# Patient Record
Sex: FEMALE | Race: BLACK OR AFRICAN AMERICAN | NOT HISPANIC OR LATINO | Employment: OTHER | URBAN - METROPOLITAN AREA
[De-identification: names, ages, dates, MRNs, and addresses within clinical notes are randomized per-mention and may not be internally consistent; named-entity substitution may affect disease eponyms.]

---

## 2017-01-23 DIAGNOSIS — N36.2 URETHRAL CARUNCLE: ICD-10-CM

## 2017-01-23 DIAGNOSIS — N95.2 VAGINAL ATROPHY: ICD-10-CM

## 2017-02-17 ENCOUNTER — OFFICE VISIT (OUTPATIENT)
Dept: UROLOGY | Facility: CLINIC | Age: 82
End: 2017-02-17
Payer: MEDICARE

## 2017-02-17 VITALS
HEIGHT: 62 IN | DIASTOLIC BLOOD PRESSURE: 80 MMHG | SYSTOLIC BLOOD PRESSURE: 150 MMHG | WEIGHT: 147.5 LBS | BODY MASS INDEX: 27.14 KG/M2 | HEART RATE: 88 BPM

## 2017-02-17 DIAGNOSIS — N95.2 VAGINAL ATROPHY: ICD-10-CM

## 2017-02-17 DIAGNOSIS — R31.29 MICROSCOPIC HEMATURIA: Primary | ICD-10-CM

## 2017-02-17 PROCEDURE — 87088 URINE BACTERIA CULTURE: CPT

## 2017-02-17 PROCEDURE — 99213 OFFICE O/P EST LOW 20 MIN: CPT | Mod: S$PBB,,, | Performed by: UROLOGY

## 2017-02-17 PROCEDURE — 87077 CULTURE AEROBIC IDENTIFY: CPT

## 2017-02-17 PROCEDURE — 87086 URINE CULTURE/COLONY COUNT: CPT

## 2017-02-17 PROCEDURE — 81002 URINALYSIS NONAUTO W/O SCOPE: CPT | Mod: PBBFAC | Performed by: UROLOGY

## 2017-02-17 PROCEDURE — 87186 SC STD MICRODIL/AGAR DIL: CPT

## 2017-02-17 PROCEDURE — 51701 INSERT BLADDER CATHETER: CPT | Mod: PBBFAC | Performed by: UROLOGY

## 2017-02-17 PROCEDURE — 99215 OFFICE O/P EST HI 40 MIN: CPT | Mod: PBBFAC | Performed by: UROLOGY

## 2017-02-17 PROCEDURE — 99999 PR PBB SHADOW E&M-EST. PATIENT-LVL V: CPT | Mod: PBBFAC,,, | Performed by: UROLOGY

## 2017-02-17 RX ORDER — LIDOCAINE HYDROCHLORIDE 20 MG/ML
JELLY TOPICAL ONCE
Status: CANCELLED | OUTPATIENT
Start: 2017-02-17 | End: 2017-02-17

## 2017-02-17 RX ORDER — CIPROFLOXACIN 250 MG/1
500 TABLET, FILM COATED ORAL ONCE
Status: CANCELLED | OUTPATIENT
Start: 2017-02-17 | End: 2017-02-17

## 2017-02-17 NOTE — PROGRESS NOTES
CHIEF COMPLAINT:    Mrs. Chino is a 81 y.o. female presenting for a follow up on vaginal atrophy, urethral caruncle    PRESENTING ILLNESS:    Lilia Chino is a 81 y.o. female who returns for follow up.  She confirms that she has been using the Premarin cream but with the applicator.  She states that she has not had a UTI since she was last seen.  She sometimes gets an irritation on the external genitalia for which she applies Mycolog cream but that is rare.  No other complaints.      REVIEW OF SYSTEMS:    Review of Systems   Constitutional: Negative.    HENT: Negative.    Eyes: Negative.    Respiratory: Negative.    Cardiovascular: Negative.    Gastrointestinal: Positive for heartburn.   Genitourinary: Negative.    Musculoskeletal: Positive for back pain.   Skin: Negative.    Neurological: Positive for sensory change (diabetic neuropathy).   Endo/Heme/Allergies: Negative.    Psychiatric/Behavioral: Negative.      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     GERD (gastroesophageal reflux disease)     Glaucoma     HBP (high blood pressure)     HLD (hyperlipidemia)     Hypoglycemia     Osteoarthritis     Pacemaker        Past Surgical History   Procedure Laterality Date    Esophagogastroduodenoscopy      Cardiac pacemaker placement         Family History   Problem Relation Age of Onset    Hypertension Mother     Cancer Father     Cancer Sister     Cancer Brother      Social History    Marital status:      Social History Main Topics    Smoking status: Never Smoker    Smokeless tobacco: Not on file    Alcohol use No    Drug use: Not on file    Sexual activity: Not on file       Allergies:  Pcn [penicillins] and Tylenol [acetaminophen]    Medications:  Outpatient Encounter Prescriptions as of 2/17/2017   Medication Sig Dispense  Refill    aspirin 81 MG Chew Take 81 mg by mouth once daily.      atenolol (TENORMIN) 50 MG tablet TAKE 1 TABLET EVERY DAY *THANK YOU* *GOD BLESS* 30 tablet 11    brimonidine 0.2% (ALPHAGAN) 0.2 % Drop 1 drop 2 (two) times daily.   4    calcium citrate-vitamin D3 315-200 mg (CITRACAL+D) 315-200 mg-unit per tablet Take 1 tablet by mouth once daily.      conjugated estrogens (PREMARIN) vaginal cream Place 0.5 g vaginally 3 (three) times a week. By finger tip application. 30 g 2    dorzolamide-timolol 2-0.5% (COSOPT) 22.3-6.8 mg/mL ophthalmic solution Place 1 drop into both eyes 2 (two) times daily.      ferrous fumarate (DASHAWN-SEQUELS) 55 mg (18 mg iron) TbSR Take 18 mg by mouth 2 (two) times daily.      folic acid (FOLVITE) 1 MG tablet TAKE ONE TABLET DAILY IN THE MORNING. (Patient taking differently: Take 1 mg by mouth once daily. TAKE ONE TABLET DAILY IN THE MORNING.) 90 tablet 3    gabapentin (NEURONTIN) 300 MG capsule TAKE 1 CAPSULE AT BEDTIME *THANK YOU* *GOD BLESS* 30 capsule 11    garlic 1,000 mg Cap Take 500 mg by mouth once daily.       lancets (TRUEPLUS LANCETS) 30 gauge Misc 1 lancet by Misc.(Non-Drug; Combo Route) route once daily. 100 each 3    latanoprost 0.005 % ophthalmic solution Place 1 drop into both eyes every evening.   4    meclizine (ANTIVERT) 12.5 mg tablet TAKE 1 OR 2 TABLETS EVERY 8 HOURS AS NEEDED FOR DIZZINESS *THANK YOU* *GOD BLESS* 30 tablet 5    metformin (GLUCOPHAGE) 500 MG tablet TAKE (1) TABLET THREE TIMES DAILY FOR DIABETES *THANK YOU* *GOD BLESS* 90 tablet 11    multivitamin (THERAGRAN) per tablet Take 1 tablet by mouth once daily.      nystatin-triamcinolone (MYCOLOG II) cream Apply topically 4 (four) times daily. 60 g 2    omeprazole (PRILOSEC) 20 MG capsule TAKE 1 CAPSULE BY MOUTH TWICE DAILY FOR stomach 90 capsule 3    simvastatin (ZOCOR) 10 MG tablet TAKE ONE TABLET AT BEDTIME 30 tablet 11     No facility-administered encounter medications on file as of  2/17/2017.          PHYSICAL EXAMINATION:    The patient generally appears in good health, is appropriately interactive, and is in no apparent distress.    Skin: No lesions.    Mental: Cooperative with normal affect.    Neuro: Grossly intact.    HEENT: Normal. No evidence of lymphadenopathy.    Chest: normal inspiratory effort.    Abdomen: Soft, non-tender. No masses or organomegaly. Bladder is not palpable. No evidence of flank discomfort. No evidence of inguinal hernia.    Extremities: No clubbing, cyanosis, or edema    Normal external female genitalia  Grade II urogenital atrophy  Urethral meatus is normal.  The urethral caruncle has resolved.    Urethra and bladder are nontender to bimanual exam  Well supported anteriorly and posteriorly   Uterus and cervix are normal  No adnexal masses    LABS:    UA 1.000, pH 7, + leuk, 50 blood, otherwise, negative    IMPRESSION:    Encounter Diagnoses   Name Primary?    Microscopic hematuria Yes    Vaginal atrophy        PLAN:    1. Renal ultrasound and cystoscopy ordered for the microscopic hematuria  2.  The catheterized specimen was sent for culture  3.  Continue the Premarin cream with fingertip application three times a week before bedtime.

## 2017-02-17 NOTE — MR AVS SNAPSHOT
[facility] - Urology 4th Floor  [address]  [facility] [address]  Phone: [phone]                  Lilia Chino   2017 1:00 PM   Office Visit    Description:  Female : 1935   Provider:  Jessika Ybarra MD   Department:  [facility] - Urology 4th Floor           Diagnoses this Visit        Comments    Microscopic hematuria    -  Primary     Vaginal atrophy                To Do List           Goals (5 Years of Data)     None      [facility]     [facility] Care [facility] -  Assistance  Registered nurses in the [facility] Center provide clinical advisement, health education, appointment booking, and other advisory services.  Call for this free service at [phone].             Medications           Message regarding Medications     Verify the changes and/or additions to your medication regime listed below are the same as discussed with your clinician today.  If any of these changes or additions are incorrect, please notify your healthcare provider.             Verify that the below list of medications is an accurate representation of the medications you are currently taking.  If none reported, the list may be blank. If incorrect, please contact your healthcare provider. Carry this list with you in case of emergency.           Current Medications     aspirin 81 MG Chew Take 81 mg by mouth once daily.    atenolol (TENORMIN) 50 MG tablet TAKE 1 TABLET EVERY DAY *THANK YOU* *GOD BLESS*    brimonidine 0.2% (ALPHAGAN) 0.2 % Drop 1 drop 2 (two) times daily.     calcium citrate-vitamin D3 315-200 mg (CITRACAL+D) 315-200 mg-unit per tablet Take 1 tablet by mouth once daily.    conjugated estrogens (PREMARIN) vaginal cream Place 0.5 g vaginally 3 (three) times a week. By finger tip application.    dorzolamide-timolol 2-0.5% (COSOPT) 22.3-6.8 mg/mL ophthalmic solution Place 1 drop into both eyes 2 (two) times daily.    ferrous fumarate (DASHAWN-SEQUELS) 55 mg (18 mg iron)  "TbSR Take 18 mg by mouth 2 (two) times daily.    folic acid (FOLVITE) 1 MG tablet TAKE ONE TABLET DAILY IN THE MORNING.    gabapentin (NEURONTIN) 300 MG capsule TAKE 1 CAPSULE AT BEDTIME *THANK YOU* *GOD BLESS*    garlic 1,000 mg Cap Take 500 mg by mouth once daily.     lancets (TRUEPLUS LANCETS) 30 gauge Misc 1 lancet by Misc.(Non-Drug; Combo Route) route once daily.    latanoprost 0.005 % ophthalmic solution Place 1 drop into both eyes every evening.     meclizine (ANTIVERT) 12.5 mg tablet TAKE 1 OR 2 TABLETS EVERY 8 HOURS AS NEEDED FOR DIZZINESS *THANK YOU* *GOD BLESS*    metformin (GLUCOPHAGE) 500 MG tablet TAKE (1) TABLET THREE TIMES DAILY FOR DIABETES *THANK YOU* *GOD BLESS*    multivitamin (THERAGRAN) per tablet Take 1 tablet by mouth once daily.    nystatin-triamcinolone (MYCOLOG II) cream Apply topically 4 (four) times daily.    omeprazole (PRILOSEC) 20 MG capsule TAKE 1 CAPSULE BY MOUTH TWICE DAILY FOR stomach    simvastatin (ZOCOR) 10 MG tablet TAKE ONE TABLET AT BEDTIME           Clinical Reference Information           Your Vitals Were     BP Pulse Height Weight BMI    150/80 (BP Location: Right arm, Patient Position: Sitting, BP Method: Automatic) 88 5' 2" (1.575 m) 66.9 kg (147 lb 7.8 oz) 26.98 kg/m2      Blood Pressure          Most Recent Value    BP  (!)  150/80      Allergies as of 2/17/2017     Pcn [Penicillins]    Tylenol [Acetaminophen]      Immunizations Administered on Date of Encounter - 2/17/2017     None      Orders Placed During Today's Visit      Normal Orders This Visit    POCT Urinalysis     Urine culture     Future Labs/Procedures Expected by Expires    US Kidney Only  2/17/2017 2/17/2018    Cystoscopy  As directed 2/17/2018      Instructions        Cystoscopy    Cystoscopy is a procedure that lets your doctor look directly inside your urethra and bladder. It can be used to:  · Help diagnose a problem with your urethra, bladder, or kidneys.  · Take a sample (biopsy) of bladder or " urethral tissue.  · Treat certain problems (such as removing kidney stones).  · Place a stent to bypass an obstruction.  · Take special X-rays of the kidneys.  Based on the findings, your doctor may recommend other tests or treatments.  What is a cystoscope?  A cystoscope is a telescope-like instrument that contains lenses and fiberoptics (small glass wires that make bright light). The cystoscope may be straight and rigid, or flexible to bend around curves in the urethra. The doctor may look directly into the cystoscope, or project the image onto a monitor.  Getting ready  · Ask your doctor if you should stop taking any medications prior to the procedure.  · Ask whether you should avoid eating or drinking anything after midnight before the procedure.  · Follow any other instructions your doctor gives you.  Tell your doctor before the exam if you:  · Take any medications, such as aspirin or blood thinners  · Have allergies to any medications  · Are pregnant   The procedure  Cystoscopy is done in the doctors office or hospital. The doctor and a nurse are present during the procedure. It takes only a few minutes, longer if a biopsy, X-ray, or treatment needs to be done.  During the procedure:  · You lie on an exam table on your back, knees bent and legs apart. You are covered with a drape.  · Your urethra and the area around it are washed. Anesthetic jelly may be applied to numb the urethra. Other pain medication is usually not needed. In some cases, you may be offered a mild sedative to help you relax. If a more extensive procedure is to be done, such as a biopsy or kidney stone removal, general anesthesia may be needed.  · The cystoscope is inserted. A sterile fluid is put into the bladder to expand it. You may feel pressure from this fluid.  · When the procedure is done, the cystoscope is removed.  After the procedure  If you had a sedative, general anesthesia, or spinal anesthesia, you must have someone drive you  home. Once youre home:  · Drink plenty of fluids.  · You may have burning or light bleeding when you urinate--this is normal.  · Medications may be prescribed to ease any discomfort or prevent infection. Take these as directed.  · Call your doctor if you have heavy bleeding or blood clots, burning that lasts more than a day, a fever over 100°F  (38° C), or trouble urinating.  Date Last Reviewed: 9/8/2014 © 2000-2016 [employer]. [address] [place] [address]. All rights reserved. This information is not intended as a substitute for professional medical care. Always follow your healthcare professional's instructions.             Language Assistance Services     ATTENTION: Language assistance services are available, free of charge. Please call [phone].      ATENCIÓN: Si jessica hernandez, tiene a whitaker disposición servicios gratuitos de asistencia lingüística. Llame al [phone].     CHÚ Ý: N?u b?n nói Ti?ng Vi?t, có các d?ch v? h? tr? ngôn ng? mi?n phí dành cho b?n. G?i s? [phone].         [facility] 4th [facility] complies with applicable Federal civil rights laws and does not discriminate on the basis of race, color, national origin, age, disability, or sex.

## 2017-02-17 NOTE — PATIENT INSTRUCTIONS

## 2017-02-20 LAB — BACTERIA UR CULT: NORMAL

## 2017-03-13 ENCOUNTER — HOSPITAL ENCOUNTER (OUTPATIENT)
Dept: UROLOGY | Facility: HOSPITAL | Age: 82
Discharge: HOME | End: 2017-03-13
Attending: UROLOGY
Payer: MEDICARE

## 2017-03-13 VITALS
HEART RATE: 68 BPM | SYSTOLIC BLOOD PRESSURE: 139 MMHG | DIASTOLIC BLOOD PRESSURE: 65 MMHG | BODY MASS INDEX: 27.18 KG/M2 | WEIGHT: 147.69 LBS | TEMPERATURE: 99 F | HEIGHT: 62 IN | RESPIRATION RATE: 18 BRPM

## 2017-03-13 DIAGNOSIS — N28.89 DILATION OF RENAL PELVIS: ICD-10-CM

## 2017-03-13 DIAGNOSIS — R82.90 ABNORMAL URINALYSIS: Primary | ICD-10-CM

## 2017-03-13 DIAGNOSIS — R31.29 MICROSCOPIC HEMATURIA: ICD-10-CM

## 2017-03-13 PROCEDURE — 87077 CULTURE AEROBIC IDENTIFY: CPT

## 2017-03-13 PROCEDURE — 25000003 PHARM REV CODE 250: Performed by: UROLOGY

## 2017-03-13 PROCEDURE — 87086 URINE CULTURE/COLONY COUNT: CPT

## 2017-03-13 PROCEDURE — 87186 SC STD MICRODIL/AGAR DIL: CPT

## 2017-03-13 PROCEDURE — 52000 CYSTOURETHROSCOPY: CPT

## 2017-03-13 PROCEDURE — 87088 URINE BACTERIA CULTURE: CPT

## 2017-03-13 RX ORDER — CIPROFLOXACIN 500 MG/1
500 TABLET ORAL ONCE
Status: DISCONTINUED | OUTPATIENT
Start: 2017-03-13 | End: 2017-03-16

## 2017-03-13 RX ORDER — LIDOCAINE HYDROCHLORIDE 20 MG/ML
JELLY TOPICAL ONCE
Status: COMPLETED | OUTPATIENT
Start: 2017-03-13 | End: 2017-03-13

## 2017-03-13 RX ADMIN — LIDOCAINE HYDROCHLORIDE: 20 JELLY TOPICAL at 09:03

## 2017-03-13 NOTE — IP AVS SNAPSHOT
[facility]  [address]  [facility] [address]  Phone: [phone]  Fax: [phone]                  Lilia Chino   3/13/2017  9:00 AM   Cystoscopy    Description:  Female : 1935   Provider:  [facility]   Department:  [facility]           Visit Information     Date & Time Provider Department    3/13/2017  9:00 AM [facility]      Recent Lab Values        2016 8/15/2016 2017                    10:27 AM  2:00 PM  1:43 PM         Urine Culture - No growth ESCHERICHIA COLI  &gt;100,000 cfu/ml           Color Yellow - -         Specific Gravity 1.020 - -         pH 6.0 - -         Nitrite Negative - -         Ketones Negative - -         Urobilinogen Negative - -                   Reason for Visit     Hematuria           Diagnoses this Visit        Comments    Microscopic hematuria                To Do List           Goals (5 Years of Data)     None           Medications                ** Verify the list of medication(s) below is accurate and up to date. Carry this with you in case of emergency. If your medications have changed, please notify your healthcare provider.             Medication List      TAKE these medications        Additional Info                      aspirin 81 MG Chew   Refills:  0   Dose:  81 mg    Instructions:  Take 81 mg by mouth once daily.     Begin Date    AM    Noon    PM    Bedtime       atenolol 50 MG tablet   Commonly known as:  TENORMIN   Quantity:  30 tablet   Refills:  11    Instructions:  TAKE 1 TABLET EVERY DAY *THANK YOU* *GOD BLESS*     Begin Date    AM    Noon    PM    Bedtime       brimonidine 0.2% 0.2 % Drop   Commonly known as:  ALPHAGAN   Refills:  4   Dose:  1 drop    Instructions:  1 drop 2 (two) times daily.     Begin Date    AM    Noon    PM    Bedtime       calcium citrate-vitamin D3 315-200 mg 315-200 mg-unit per tablet   Commonly known as:  CITRACAL+D    Refills:  0   Dose:  1 tablet    Instructions:  Take 1 tablet by mouth once daily.     Begin Date    AM    Noon    PM    Bedtime       conjugated estrogens vaginal cream   Commonly known as:  PREMARIN   Quantity:  30 g   Refills:  2   Dose:  0.5 g   Comments:  This prescription was filled today(1/23/2017). Any refills authorized will be placed on file.    Instructions:  Place 0.5 g vaginally 3 (three) times a week. By finger tip application.     Begin Date    AM    Noon    PM    Bedtime       dorzolamide-timolol 2-0.5% 22.3-6.8 mg/mL ophthalmic solution   Commonly known as:  COSOPT   Refills:  0   Dose:  1 drop    Instructions:  Place 1 drop into both eyes 2 (two) times daily.     Begin Date    AM    Noon    PM    Bedtime       ferrous fumarate 55 mg (18 mg iron) Tbsr   Commonly known as:  DASHAWN-SEQUELS   Refills:  0   Dose:  18 mg    Instructions:  Take 18 mg by mouth 2 (two) times daily.     Begin Date    AM    Noon    PM    Bedtime       folic acid 1 MG tablet   Commonly known as:  FOLVITE   Quantity:  90 tablet   Refills:  3   Comments:  This prescription was filled today. Any refills authorized will be placed on file.    Instructions:  TAKE ONE TABLET DAILY IN THE MORNING.     Begin Date    AM    Noon    PM    Bedtime       gabapentin 300 MG capsule   Commonly known as:  NEURONTIN   Quantity:  30 capsule   Refills:  11   Comments:  This prescription was filled today(1/3/2017). Any refills authorized will be placed on file.    Instructions:  TAKE 1 CAPSULE AT BEDTIME *THANK YOU* *ZACH DUGGAN*     Begin Date    AM    Noon    PM    Bedtime       garlic 1,000 mg Cap   Refills:  0   Dose:  500 mg    Instructions:  Take 500 mg by mouth once daily.     Begin Date    AM    Noon    PM    Bedtime       latanoprost 0.005 % ophthalmic solution   Refills:  4   Dose:  1 drop    Instructions:  Place 1 drop into both eyes every evening.     Begin Date    AM    Noon    PM    Bedtime       meclizine 12.5 mg tablet   Commonly  known as:  ANTIVERT   Quantity:  30 tablet   Refills:  5   Comments:  This prescription was filled today(1/3/2017). Any refills authorized will be placed on file.    Instructions:  TAKE 1 OR 2 TABLETS EVERY 8 HOURS AS NEEDED FOR DIZZINESS *THANK YOU* *ZACH DUGGAN*     Begin Date    AM    Noon    PM    Bedtime       metformin 500 MG tablet   Commonly known as:  GLUCOPHAGE   Quantity:  90 tablet   Refills:  11   Comments:  This prescription was filled today(2/14/2017). Any refills authorized will be placed on file.    Instructions:  TAKE (1) TABLET THREE TIMES DAILY FOR DIABETES *THANK YOU* *ZACH DUGGAN*     Begin Date    AM    Noon    PM    Bedtime       multivitamin per tablet   Commonly known as:  THERAGRAN   Refills:  0   Dose:  1 tablet    Instructions:  Take 1 tablet by mouth once daily.     Begin Date    AM    Noon    PM    Bedtime       nystatin-triamcinolone cream   Commonly known as:  MYCOLOG II   Quantity:  60 g   Refills:  2    Instructions:  Apply topically 4 (four) times daily.     Begin Date    AM    Noon    PM    Bedtime       omeprazole 20 MG capsule   Commonly known as:  PRILOSEC   Quantity:  90 capsule   Refills:  1   Comments:  This prescription was filled today(2/21/2017). Any refills authorized will be placed on file.    Instructions:  TAKE 1 CAPSULE BY MOUTH TWICE DAILY FOR stomach *THANK YOU* *ZACH DUGGAN*     Begin Date    AM    Noon    PM    Bedtime       simvastatin 10 MG tablet   Commonly known as:  ZOCOR   Quantity:  30 tablet   Refills:  11   Comments:  This prescription was filled today(2/14/2017). Any refills authorized will be placed on file.    Instructions:  TAKE ONE TABLET AT BEDTIME     Begin Date    AM    Noon    PM    Bedtime       TRUEPLUS LANCETS 28 gauge Misc   Quantity:  100 each   Refills:  3   Generic drug:  lancets    Instructions:  TEST DAILY *THANK YOU*     Begin Date    AM    Noon    PM    Bedtime               Your Vitals Were     BP Pulse Temp Resp Height Weight    134/69  "81 98.6 °F (37 °C) (Oral) 18 5' 2" (1.575 m) 67 kg (147 lb 11.3 oz)    BMI                27.02 kg/m2          Allergies as of 3/13/2017     Pcn [Penicillins]    Tylenol [Acetaminophen]      Immunizations Administered on Date of Encounter - 3/13/2017     None      Instructions    What to Expect After a Cystoscopy  For the next 24-48 hours, you may feel a mild burning when you urinate. This burning is normal and expected. Drink plenty of water to dilute the urine to help relieve the burning sensation. You may also see a small amount of blood in your urine after the procedure.    Unless you are already taking antibiotics, you may be given an antibiotic after the test to prevent infection.    Signs and Symptoms to Report  Call the [facility] at [phone] if you develop any of the following:  · Fever of 101 degrees or higher  · Chills or persistent bleeding  · Inability to urinate       Advance Directives     An advance directive is a document which, in the event you are no longer able to make decisions for yourself, tells your healthcare team what kind of treatment you do or do not want to receive, or who you would like to make those decisions for you.  If you do not currently have an advance directive, [facility] encourages you to create one.  For more information call:  [phone]), [phone] ([phone]),  or log on to [email or web address].        [facility]     [facility] Care Line - 24/7 Assistance  Registered nurses in the [facility] Center provide clinical advisement, health education, appointment booking, and other advisory services.  Call for this free service at [phone].        Language Assistance Services     ATTENTION: Language assistance services are available, free of charge. Please call [phone].      ATENCIÓN: Si habla español, tiene a whitaker disposición servicios gratuitos de asistencia lingüística. Llame al [phone].     CHÚ Ý: " N?u b?n nói Ti?ng Vi?t, có các d?ch v? h? tr? ngôn ng? mi?n phí dành cho b?n. G?i s? [phone].         [facility] complies with applicable Federal civil rights laws and does not discriminate on the basis of race, color, national origin, age, disability, or sex.

## 2017-03-13 NOTE — INTERVAL H&P NOTE
The patient has been examined and the H&P has been reviewed:    I concur with the findings and changes have been noted since the H&P was written: a      Urine was very strong smelling.  She has no symptoms, however, due to age and comorbidities, will send a catheterized specimen.  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  Will get BMP and IVP.  Urine culture.  And reschedule.          There are no hospital problems to display for this patient.

## 2017-03-15 ENCOUNTER — HOSPITAL ENCOUNTER (OUTPATIENT)
Dept: RADIOLOGY | Facility: HOSPITAL | Age: 82
Discharge: HOME | End: 2017-03-15
Attending: UROLOGY
Payer: MEDICARE

## 2017-03-15 DIAGNOSIS — R82.90 ABNORMAL URINALYSIS: ICD-10-CM

## 2017-03-15 DIAGNOSIS — N28.89 DILATION OF RENAL PELVIS: ICD-10-CM

## 2017-03-15 LAB — BACTERIA UR CULT: NORMAL

## 2017-03-15 PROCEDURE — 74400 UROGRAPHY IV +-KUB TOMOG: CPT | Mod: 26,,, | Performed by: RADIOLOGY

## 2017-03-15 PROCEDURE — 74400 UROGRAPHY IV +-KUB TOMOG: CPT | Mod: TC,PO

## 2017-03-15 PROCEDURE — 25500020 PHARM REV CODE 255: Mod: PO | Performed by: UROLOGY

## 2017-03-15 RX ADMIN — IOHEXOL 100 ML: 350 INJECTION, SOLUTION INTRAVENOUS at 10:03

## 2017-03-16 ENCOUNTER — TELEPHONE (OUTPATIENT)
Dept: UROLOGY | Facility: CLINIC | Age: 82
End: 2017-03-16

## 2017-03-16 DIAGNOSIS — N39.0 RECURRENT UTI: Primary | ICD-10-CM

## 2017-03-16 DIAGNOSIS — N30.90 BLADDER INFECTION: Primary | ICD-10-CM

## 2017-03-16 RX ORDER — SULFAMETHOXAZOLE AND TRIMETHOPRIM 800; 160 MG/1; MG/1
1 TABLET ORAL 2 TIMES DAILY
Qty: 14 TABLET | Refills: 0 | Status: SHIPPED | OUTPATIENT
Start: 2017-03-16 | End: 2017-03-23

## 2017-03-16 NOTE — TELEPHONE ENCOUNTER
Called the patient and informed her about the IVP.  Also discussed the urine culture results.  Bactrim DS was sent to her preferred pharmacy.      Will reschedule the cystoscopy

## 2017-03-16 NOTE — PATIENT INSTRUCTIONS

## 2017-04-24 ENCOUNTER — HOSPITAL ENCOUNTER (OUTPATIENT)
Dept: UROLOGY | Facility: HOSPITAL | Age: 82
Discharge: HOME | End: 2017-04-24
Attending: UROLOGY
Payer: MEDICARE

## 2017-04-24 VITALS
HEIGHT: 62 IN | RESPIRATION RATE: 18 BRPM | WEIGHT: 144.81 LBS | TEMPERATURE: 99 F | SYSTOLIC BLOOD PRESSURE: 146 MMHG | HEART RATE: 69 BPM | DIASTOLIC BLOOD PRESSURE: 64 MMHG | BODY MASS INDEX: 26.65 KG/M2

## 2017-04-24 DIAGNOSIS — N13.30 HYDRONEPHROSIS, RIGHT: Primary | ICD-10-CM

## 2017-04-24 DIAGNOSIS — N39.0 RECURRENT UTI: ICD-10-CM

## 2017-04-24 PROCEDURE — 52000 CYSTOURETHROSCOPY: CPT | Mod: ,,, | Performed by: UROLOGY

## 2017-04-24 PROCEDURE — 52000 CYSTOURETHROSCOPY: CPT

## 2017-04-24 RX ORDER — LIDOCAINE HYDROCHLORIDE 20 MG/ML
JELLY TOPICAL ONCE
Status: COMPLETED | OUTPATIENT
Start: 2017-04-24 | End: 2017-04-24

## 2017-04-24 RX ORDER — CIPROFLOXACIN 500 MG/1
500 TABLET ORAL
Status: COMPLETED | OUTPATIENT
Start: 2017-04-24 | End: 2017-04-24

## 2017-04-24 RX ADMIN — CIPROFLOXACIN 500 MG: 500 TABLET ORAL at 09:04

## 2017-04-24 RX ADMIN — LIDOCAINE HYDROCHLORIDE: 20 JELLY TOPICAL at 08:04

## 2017-04-24 NOTE — H&P
CHIEF COMPLAINT:    Mrs. Chino is a 81 y.o. female presenting for a cystoscopy for the workup of recurrent UTI    PRESENTING ILLNESS:    Lilia Chino is a 81 y.o. female who has a history of recurrent UTI.  She was found to have right hydronephrosis on the renal ultrasound.  Because of her co morbidities, she had an IVP which showed right sided hydroureteronephrosis.  However, there is equal uptake and excretion on both sides.  There does not appear to be a tumor present.  Had a recent UTI, which I treated with Bactrim DS however, she could not tolerate it had abdominal pain.  It is noted in the chart.  She presents for cystoscopy.      REVIEW OF SYSTEMS:    Review of Systems   Constitutional: Negative.    HENT: Negative.    Eyes: Negative.    Respiratory: Negative.    Cardiovascular: Negative.    Gastrointestinal: Positive for abdominal pain.   Genitourinary: Positive for urgency.   Musculoskeletal: Negative.    Skin: Negative.    Neurological: Negative.    Endo/Heme/Allergies: Negative.    Psychiatric/Behavioral: Negative.      PATIENT HISTORY:    Past Medical History:   Diagnosis Date    Anemia, chronic disease     Coronary artery disease     Diabetes mellitus     Diabetic neuropathy associated with type 2 diabetes mellitus     GERD (gastroesophageal reflux disease)     Glaucoma     HBP (high blood pressure)     HLD (hyperlipidemia)     Hypoglycemia     Osteoarthritis     Pacemaker        Past Surgical History:   Procedure Laterality Date    CARDIAC PACEMAKER PLACEMENT      ESOPHAGOGASTRODUODENOSCOPY         Family History   Problem Relation Age of Onset    Hypertension Mother     Cancer Father     Cancer Sister     Cancer Brother      Social History    Marital status:      Social History Main Topics    Smoking status: Never Smoker    Smokeless tobacco: Not on file    Alcohol use No    Drug use: Not on file    Sexual activity: Not on file       Allergies:  Pcn [penicillins]; Sulfa  (sulfonamide antibiotics); and Tylenol [acetaminophen]    Medications:  [unfilled]      PHYSICAL EXAMINATION:    The patient generally appears in good health, is appropriately interactive, and is in no apparent distress.    Skin: No lesions.    Mental: Cooperative with normal affect.    Neuro: Grossly intact.    HEENT: Normal. No evidence of lymphadenopathy.    Chest:  normal inspiratory effort.    Extremities: No clubbing, cyanosis, or edema      IMPRESSION:    Recurrent UTI    PLAN:    1. For flexible cystoscopy

## 2017-04-24 NOTE — PROCEDURES
CYSTOSCOPY REPORT    Pre Procedure Diagnosis:  Recurrent UTI    Post Procedure Diagnosis: normal lower urinary tract    Anesthesia: 10 cc 2% lidocaine jelly applied per urethra.    14 FR Flexible Olympus cystoscope used.    FINDINGS:  Bilateral efflux noted that was equally brisk.  Dome, anterior, posterior, lateral walls and bladder base free of urothelial abnormalities. Right and left ureteral orifices in the normal postion and configuration, both effluxed clear urine.  Bladder neck and urethra were normal.    Specimen:  none    The patient was taken to the cystoscopy suite and placed in dorsal lithotomy position.  The genitalia was prepped and draped  in the usual sterile fashion.  Two percent lidocaine jelly was inserted in the urethra.  After sufficent time had passed to allow good local anesthesia, the cystoscope was inserted in the urethra and passed into the bladder visualizing the urethra along its entire course.  The dome, anterior, posterior and lateral walls were examined systematically.  The ureteral orifices were in their usual position and configuration.  The cystoscope was turned upon itself 180 degrees to visualize the bladder neck.  The cystoscope was then brought to the level of the bladder neck, the water was turned on and the urethra was visualized.  The cystoscope was removed and the patient was instructed to urinate prior to leaving the office.     Post procedure medication:  cipro 500 mg x 1     ASSESSMENT/PLAN:  81 year old woman status post flexible cystoscopy.  1. Push fluids for 24 hours.  2. May see blood in the urine, this should gradually improve over the next 2-3 days.  3. The patient was instructed to return to the office or go to the emergency should fever, chills, cloudy urine, or inability to urinate develop.  4. Follow up for CT RSS.

## 2017-04-24 NOTE — PATIENT INSTRUCTIONS
What to Expect After a Cystoscopy  For the next 24-48 hours, you may feel a mild burning when you urinate. This burning is normal and expected. Drink plenty of water to dilute the urine to help relieve the burning sensation. You may also see a small amount of blood in your urine after the procedure.    Unless you are already taking antibiotics, you may be given an antibiotic after the test to prevent infection.    Signs and Symptoms to Report  Call the [facility] at [phone] if you develop any of the following:  · Fever of 101 degrees or higher  · Chills or persistent bleeding  · Inability to urinate

## 2017-04-24 NOTE — IP AVS SNAPSHOT
[facility]  [address]  [facility] [address]  Phone: [phone]  Fax: [phone]                  Lilia Chino   2017  9:00 AM   Cystoscopy    Description:  Female : 1935   Provider:  [facility]   Department:  [facility]           Visit Information     Date & Time Provider Department    2017  9:00 AM [facility]      Recent Lab Values        2016 8/15/2016 2017 3/13/2017                 10:27 AM  2:00 PM  1:43 PM  9:40 AM        Urine Culture - No growth ESCHERICHIA COLI  &gt;100,000 cfu/ml   ESCHERICHIA COLI  &gt;100,000 cfu/ml          Color Yellow - - -        Specific Gravity 1.020 - - -        pH 6.0 - - -        Nitrite Negative - - -        Ketones Negative - - -        Urobilinogen Negative - - -                 Reason for Visit     Hematuria           Diagnoses this Visit        Comments    Recurrent UTI                To Do List           Goals (5 Years of Data)     None           Medications                ** Verify the list of medication(s) below is accurate and up to date. Carry this with you in case of emergency. If your medications have changed, please notify your healthcare provider.             Medication List      TAKE these medications        Additional Info                      aspirin 81 MG Chew   Refills:  0   Dose:  81 mg    Instructions:  Take 81 mg by mouth once daily.     Begin Date    AM    Noon    PM    Bedtime       atenolol 50 MG tablet   Commonly known as:  TENORMIN   Quantity:  30 tablet   Refills:  11    Instructions:  TAKE 1 TABLET EVERY DAY *THANK YOU* *ZACH DUGGAN*     Begin Date    AM    Noon    PM    Bedtime       brimonidine 0.2% 0.2 % Drop   Commonly known as:  ALPHAGAN   Refills:  4   Dose:  1 drop    Instructions:  1 drop 2 (two) times daily.     Begin Date    AM    Noon    PM    Bedtime       calcium citrate-vitamin D3 315-200 mg 315-200  mg-unit per tablet   Commonly known as:  CITRACAL+D   Refills:  0   Dose:  1 tablet    Instructions:  Take 1 tablet by mouth once daily.     Begin Date    AM    Noon    PM    Bedtime       conjugated estrogens vaginal cream   Commonly known as:  PREMARIN   Quantity:  30 g   Refills:  2   Dose:  0.5 g   Comments:  This prescription was filled today(1/23/2017). Any refills authorized will be placed on file.    Instructions:  Place 0.5 g vaginally 3 (three) times a week. By finger tip application.     Begin Date    AM    Noon    PM    Bedtime       dorzolamide-timolol 2-0.5% 22.3-6.8 mg/mL ophthalmic solution   Commonly known as:  COSOPT   Refills:  0   Dose:  1 drop    Instructions:  Place 1 drop into both eyes 2 (two) times daily.     Begin Date    AM    Noon    PM    Bedtime       ferrous fumarate 55 mg (18 mg iron) Tbsr   Commonly known as:  DASHAWN-SEQUELS   Refills:  0   Dose:  18 mg    Instructions:  Take 18 mg by mouth 2 (two) times daily.     Begin Date    AM    Noon    PM    Bedtime       folic acid 1 MG tablet   Commonly known as:  FOLVITE   Quantity:  90 tablet   Refills:  3   Comments:  This prescription was filled today(3/17/2017). Any refills authorized will be placed on file.    Instructions:  TAKE 1 TABLET EVERY MORNING *THANK YOU* *GOD BLESS*     Begin Date    AM    Noon    PM    Bedtime       gabapentin 300 MG capsule   Commonly known as:  NEURONTIN   Quantity:  30 capsule   Refills:  11   Comments:  This prescription was filled today(1/3/2017). Any refills authorized will be placed on file.    Instructions:  TAKE 1 CAPSULE AT BEDTIME *THANK YOU* *GOD BLESS*     Begin Date    AM    Noon    PM    Bedtime       garlic 1,000 mg Cap   Refills:  0   Dose:  500 mg    Instructions:  Take 500 mg by mouth once daily.     Begin Date    AM    Noon    PM    Bedtime       latanoprost 0.005 % ophthalmic solution   Refills:  4   Dose:  1 drop    Instructions:  Place 1 drop into both eyes every evening.     Begin Date     AM    Noon    PM    Bedtime       meclizine 12.5 mg tablet   Commonly known as:  ANTIVERT   Quantity:  30 tablet   Refills:  5   Comments:  This prescription was filled today(1/3/2017). Any refills authorized will be placed on file.    Instructions:  TAKE 1 OR 2 TABLETS EVERY 8 HOURS AS NEEDED FOR DIZZINESS *THANK YOU* *ZACH DUGGAN*     Begin Date    AM    Noon    PM    Bedtime       metformin 500 MG tablet   Commonly known as:  GLUCOPHAGE   Quantity:  90 tablet   Refills:  11   Comments:  This prescription was filled today(2/14/2017). Any refills authorized will be placed on file.    Instructions:  TAKE (1) TABLET THREE TIMES DAILY FOR DIABETES *THANK YOU* *ZACH DUGGAN*     Begin Date    AM    Noon    PM    Bedtime       multivitamin per tablet   Commonly known as:  THERAGRAN   Refills:  0   Dose:  1 tablet    Instructions:  Take 1 tablet by mouth once daily.     Begin Date    AM    Noon    PM    Bedtime       nystatin-triamcinolone cream   Commonly known as:  MYCOLOG II   Quantity:  60 g   Refills:  2    Instructions:  Apply topically 4 (four) times daily.     Begin Date    AM    Noon    PM    Bedtime       omeprazole 20 MG capsule   Commonly known as:  PRILOSEC   Quantity:  90 capsule   Refills:  1   Comments:  This prescription was filled today(2/21/2017). Any refills authorized will be placed on file.    Instructions:  TAKE 1 CAPSULE BY MOUTH TWICE DAILY FOR stomach *THANK YOU* *ZACH DUGGAN*     Begin Date    AM    Noon    PM    Bedtime       simvastatin 10 MG tablet   Commonly known as:  ZOCOR   Quantity:  30 tablet   Refills:  11   Comments:  This prescription was filled today(2/14/2017). Any refills authorized will be placed on file.    Instructions:  TAKE ONE TABLET AT BEDTIME     Begin Date    AM    Noon    PM    Bedtime       TRUEPLUS LANCETS 28 gauge Misc   Quantity:  100 each   Refills:  3   Generic drug:  lancets    Instructions:  TEST DAILY *THANK YOU*     Begin Date    AM    Noon    PM    Bedtime        "        Your Vitals Were     BP Pulse Temp Resp Height Weight    146/64 69 98.8 °F (37.1 °C) 18 5' 2" (1.575 m) 65.7 kg (144 lb 13.5 oz)    BMI                26.49 kg/m2          Allergies as of 4/24/2017     Pcn [Penicillins]    Sulfa (Sulfonamide Antibiotics)    Tylenol [Acetaminophen]      Immunizations Administered on Date of Encounter - 4/24/2017     None      Administrations This Visit     lidocaine HCl 2% urojet     Admin Date Action Dose Route Administered By             04/24/2017 Given   Mucous Membrane Laly Walker, FRANKIE                      Instructions    What to Expect After a Cystoscopy  For the next 24-48 hours, you may feel a mild burning when you urinate. This burning is normal and expected. Drink plenty of water to dilute the urine to help relieve the burning sensation. You may also see a small amount of blood in your urine after the procedure.    Unless you are already taking antibiotics, you may be given an antibiotic after the test to prevent infection.    Signs and Symptoms to Report  Call the [facility] at [phone] if you develop any of the following:  · Fever of 101 degrees or higher  · Chills or persistent bleeding  · Inability to urinate       Advance Directives     An advance directive is a document which, in the event you are no longer able to make decisions for yourself, tells your healthcare team what kind of treatment you do or do not want to receive, or who you would like to make those decisions for you.  If you do not currently have an advance directive, [facility] encourages you to create one.  For more information call:  [phone]), [phone] ([phone]),  or log on to [email or web address].        [facility]     [facility] Nurse Care Line - 24/7 Assistance  Unless otherwise directed by your provider, please contact [facility], our nurse care line that is available for 24/7 assistance.     Registered nurses in the [facility] provide: appointment scheduling, clinical advisement, health education, and other advisory services.  Call: [phone] (toll free)          Language Assistance Services     ATTENTION: Language assistance services are available, free of charge. Please call [phone].      ATENCIÓN: Si habla david, tiene a whitaker disposición servicios gratuitos de asistencia lingüística. Llame al [phone].     CHÚ Ý: N?u b?n nói Ti?ng Vi?t, có các d?ch v? h? tr? ngôn ng? mi?n phí dành cho b?n. G?i s? [phone].         [facility] complies with applicable Federal civil rights laws and does not discriminate on the basis of race, color, national origin, age, disability, or sex.

## 2017-05-02 ENCOUNTER — HOSPITAL ENCOUNTER (OUTPATIENT)
Dept: RADIOLOGY | Facility: HOSPITAL | Age: 82
Discharge: HOME | End: 2017-05-02
Attending: UROLOGY
Payer: MEDICARE

## 2017-05-02 DIAGNOSIS — N13.30 HYDRONEPHROSIS, RIGHT: ICD-10-CM

## 2017-05-02 PROCEDURE — 74176 CT ABD & PELVIS W/O CONTRAST: CPT | Mod: TC,PO

## 2017-05-02 PROCEDURE — 74176 CT ABD & PELVIS W/O CONTRAST: CPT | Mod: 26,,, | Performed by: RADIOLOGY

## 2018-03-13 PROBLEM — D47.2 MGUS (MONOCLONAL GAMMOPATHY OF UNKNOWN SIGNIFICANCE): Status: ACTIVE | Noted: 2018-03-13

## 2018-06-06 PROBLEM — M47.16: Status: ACTIVE | Noted: 2018-06-06

## 2018-06-06 PROBLEM — M46.1 SACROILIITIS: Status: ACTIVE | Noted: 2018-06-06

## 2018-06-11 ENCOUNTER — TELEPHONE (OUTPATIENT)
Dept: RHEUMATOLOGY | Facility: CLINIC | Age: 83
End: 2018-06-11

## 2018-06-11 NOTE — TELEPHONE ENCOUNTER
----- Message from Juan Lee sent at 6/11/2018  3:06 PM CDT -----  Contact: Patient  Lilia, [phone]. Calling to schedule new patient appointment. Being referred from Dr. Salinas. Please advise. Thanks.    Patient booked 11-7-18. Patient aware she is on the wait list. CG

## 2018-07-10 ENCOUNTER — LAB VISIT (OUTPATIENT)
Dept: LAB | Facility: HOSPITAL | Age: 83
End: 2018-07-10
Attending: INTERNAL MEDICINE
Payer: MEDICARE

## 2018-07-10 DIAGNOSIS — D47.2 SMOLDERING MYELOMA: ICD-10-CM

## 2018-07-10 PROCEDURE — 86334 IMMUNOFIX E-PHORESIS SERUM: CPT | Mod: 26,,, | Performed by: PATHOLOGY

## 2018-07-10 PROCEDURE — 36415 COLL VENOUS BLD VENIPUNCTURE: CPT | Mod: PN

## 2018-07-10 PROCEDURE — 86334 IMMUNOFIX E-PHORESIS SERUM: CPT

## 2018-07-11 LAB
INTERPRETATION SERPL IFE-IMP: NORMAL
PATHOLOGIST INTERPRETATION IFE: NORMAL

## 2018-08-13 ENCOUNTER — LAB VISIT (OUTPATIENT)
Dept: LAB | Facility: HOSPITAL | Age: 83
End: 2018-08-13
Attending: INTERNAL MEDICINE
Payer: MEDICARE

## 2018-08-13 DIAGNOSIS — D47.2 SMOLDERING MYELOMA: ICD-10-CM

## 2018-08-13 LAB
ALBUMIN SERPL BCP-MCNC: 4.1 G/DL
ALP SERPL-CCNC: 82 U/L
ALT SERPL W/O P-5'-P-CCNC: 25 U/L
ANION GAP SERPL CALC-SCNC: 9 MMOL/L
AST SERPL-CCNC: 16 U/L
B2 MICROGLOB SERPL-MCNC: 2.2 UG/ML
BASOPHILS # BLD AUTO: 0.05 K/UL
BASOPHILS NFR BLD: 0.7 %
BILIRUB SERPL-MCNC: 0.3 MG/DL
BUN SERPL-MCNC: 14 MG/DL
CALCIUM SERPL-MCNC: 9.3 MG/DL
CHLORIDE SERPL-SCNC: 102 MMOL/L
CO2 SERPL-SCNC: 28 MMOL/L
CREAT SERPL-MCNC: 0.65 MG/DL
CRP SERPL-MCNC: 1.5 MG/DL
DIFFERENTIAL METHOD: ABNORMAL
EOSINOPHIL # BLD AUTO: 0.1 K/UL
EOSINOPHIL NFR BLD: 1.1 %
ERYTHROCYTE [DISTWIDTH] IN BLOOD BY AUTOMATED COUNT: 15.9 %
EST. GFR  (AFRICAN AMERICAN): >60 ML/MIN/1.73 M^2
EST. GFR  (NON AFRICAN AMERICAN): >60 ML/MIN/1.73 M^2
FERRITIN SERPL-MCNC: 47 NG/ML
GLUCOSE SERPL-MCNC: 107 MG/DL
HCT VFR BLD AUTO: 33 %
HGB BLD-MCNC: 10 G/DL
IGA SERPL-MCNC: 201 MG/DL
IGG SERPL-MCNC: 1411 MG/DL
IGM SERPL-MCNC: 58 MG/DL
IRON SATN MFR SERPL: 14 %
IRON SERPL-MCNC: 42 UG/DL
LDH SERPL L TO P-CCNC: 398 U/L
LYMPHOCYTES # BLD AUTO: 2.4 K/UL
LYMPHOCYTES NFR BLD: 32.7 %
MCH RBC QN AUTO: 25 PG
MCHC RBC AUTO-ENTMCNC: 30.3 G/DL
MCV RBC AUTO: 83 FL
MONOCYTES # BLD AUTO: 0.6 K/UL
MONOCYTES NFR BLD: 7.5 %
NEUTROPHILS # BLD AUTO: 4.3 K/UL
NEUTROPHILS NFR BLD: 58 %
NRBC BLD-RTO: 0 /100 WBC
PLATELET # BLD AUTO: 311 K/UL
PMV BLD AUTO: 8.7 FL
POTASSIUM SERPL-SCNC: 4.2 MMOL/L
PROT SERPL-MCNC: 7.9 G/DL
RBC # BLD AUTO: 4 M/UL
SODIUM SERPL-SCNC: 139 MMOL/L
TOTAL IRON BINDING CAPACITY: 298 UG/DL
WBC # BLD AUTO: 7.38 K/UL

## 2018-08-13 PROCEDURE — 82728 ASSAY OF FERRITIN: CPT | Mod: PN

## 2018-08-13 PROCEDURE — 86140 C-REACTIVE PROTEIN: CPT | Mod: PN

## 2018-08-13 PROCEDURE — 86334 IMMUNOFIX E-PHORESIS SERUM: CPT | Mod: 26,,, | Performed by: PATHOLOGY

## 2018-08-13 PROCEDURE — 83615 LACTATE (LD) (LDH) ENZYME: CPT | Mod: PN

## 2018-08-13 PROCEDURE — 84165 PROTEIN E-PHORESIS SERUM: CPT

## 2018-08-13 PROCEDURE — 80053 COMPREHEN METABOLIC PANEL: CPT

## 2018-08-13 PROCEDURE — 80053 COMPREHEN METABOLIC PANEL: CPT | Mod: PN

## 2018-08-13 PROCEDURE — 84165 PROTEIN E-PHORESIS SERUM: CPT | Mod: 26,,, | Performed by: PATHOLOGY

## 2018-08-13 PROCEDURE — 86140 C-REACTIVE PROTEIN: CPT

## 2018-08-13 PROCEDURE — 83615 LACTATE (LD) (LDH) ENZYME: CPT

## 2018-08-13 PROCEDURE — 82784 ASSAY IGA/IGD/IGG/IGM EACH: CPT | Mod: 59

## 2018-08-13 PROCEDURE — 85025 COMPLETE CBC W/AUTO DIFF WBC: CPT

## 2018-08-13 PROCEDURE — 83540 ASSAY OF IRON: CPT

## 2018-08-13 PROCEDURE — 82232 ASSAY OF BETA-2 PROTEIN: CPT

## 2018-08-13 PROCEDURE — 36415 COLL VENOUS BLD VENIPUNCTURE: CPT | Mod: PN

## 2018-08-13 PROCEDURE — 85025 COMPLETE CBC W/AUTO DIFF WBC: CPT | Mod: PN

## 2018-08-13 PROCEDURE — 82728 ASSAY OF FERRITIN: CPT

## 2018-08-13 PROCEDURE — 83540 ASSAY OF IRON: CPT | Mod: PN

## 2018-08-13 PROCEDURE — 86334 IMMUNOFIX E-PHORESIS SERUM: CPT

## 2018-08-14 LAB
ALBUMIN SERPL ELPH-MCNC: 3.62 G/DL
ALPHA1 GLOB SERPL ELPH-MCNC: 0.36 G/DL
ALPHA2 GLOB SERPL ELPH-MCNC: 0.76 G/DL
B-GLOBULIN SERPL ELPH-MCNC: 0.93 G/DL
GAMMA GLOB SERPL ELPH-MCNC: 1.33 G/DL
INTERPRETATION SERPL IFE-IMP: NORMAL
PATHOLOGIST INTERPRETATION IFE: NORMAL
PATHOLOGIST INTERPRETATION SPE: NORMAL
PROT SERPL-MCNC: 7 G/DL

## 2018-08-29 PROBLEM — M54.17 LUMBOSACRAL RADICULOPATHY: Status: ACTIVE | Noted: 2018-08-29

## 2018-09-24 PROBLEM — M48.061 SPINAL STENOSIS, LUMBAR REGION WITHOUT NEUROGENIC CLAUDICATION: Status: ACTIVE | Noted: 2018-09-24

## 2018-11-01 ENCOUNTER — TELEPHONE (OUTPATIENT)
Dept: RHEUMATOLOGY | Facility: CLINIC | Age: 83
End: 2018-11-01

## 2018-11-01 NOTE — TELEPHONE ENCOUNTER
Returned patient's call. Patient called to reschedule new patient appt. Patient has appt conflict with another appt. Rescheduled to first available and placed on cancellation list.

## 2018-11-05 ENCOUNTER — TELEPHONE (OUTPATIENT)
Dept: RHEUMATOLOGY | Facility: CLINIC | Age: 83
End: 2018-11-05

## 2018-11-05 NOTE — TELEPHONE ENCOUNTER
Talked to patient. Patient call on 11/01 to cancel new patient appt with Dr. Juarez. Patient states she had an appt conflict. Informed patient April 2019 would be first available for reschedule. Patient called today stating that she cancelled the wrong DrNini Appt. She meant to cancel the other conflicting appt and keep Dr. Juarez's. Informed patient awaiting cancellation to move patient sooner to see Dr. Juarez.

## 2018-11-05 NOTE — TELEPHONE ENCOUNTER
----- Message from Maulik Mackenzie sent at 11/5/2018 11:25 AM CST -----  Contact: Son/Codie  Jazzmark called in and wanted to confirm patients appt on 11/7/18 at 9am and was informed it had been cancelled.  Codie stated he was not aware of this appt being cancelled and was asking why it was cancelled on 11/1/18?    Codie call back number is [phone]

## 2018-11-13 ENCOUNTER — INITIAL CONSULT (OUTPATIENT)
Dept: RHEUMATOLOGY | Facility: CLINIC | Age: 83
End: 2018-11-13
Payer: MEDICARE

## 2018-11-13 VITALS
HEIGHT: 62 IN | WEIGHT: 139 LBS | SYSTOLIC BLOOD PRESSURE: 161 MMHG | DIASTOLIC BLOOD PRESSURE: 78 MMHG | BODY MASS INDEX: 25.58 KG/M2 | HEART RATE: 95 BPM

## 2018-11-13 DIAGNOSIS — M51.36 DDD (DEGENERATIVE DISC DISEASE), LUMBAR: Primary | ICD-10-CM

## 2018-11-13 DIAGNOSIS — M15.9 PRIMARY OSTEOARTHRITIS INVOLVING MULTIPLE JOINTS: ICD-10-CM

## 2018-11-13 PROCEDURE — 99204 OFFICE O/P NEW MOD 45 MIN: CPT | Mod: S$PBB,,, | Performed by: INTERNAL MEDICINE

## 2018-11-13 PROCEDURE — 99213 OFFICE O/P EST LOW 20 MIN: CPT | Mod: PBBFAC,PO | Performed by: INTERNAL MEDICINE

## 2018-11-13 PROCEDURE — 99999 PR PBB SHADOW E&M-EST. PATIENT-LVL III: CPT | Mod: PBBFAC,,, | Performed by: INTERNAL MEDICINE

## 2018-11-13 NOTE — PROGRESS NOTES
Subjective:          Chief Complaint: Lilia Chino is a 83 y.o. female who had concerns including Arthritis (per patient Dr. Bejarano referral.) and swelling in hands and feet.    HPI:    Patient is an 83-year-old female referred by Dr. Bejarano for arthritis she is noting she was having difficulty with walking having pain is seen various doctors no clear answer.   Patient has a history of diabetes, coronary artery disease, symptomatic bradycardia, GERD hypertension hyperlipidemia.  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    Patient is followed with Dr. 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  It appears she is having epidural steroid injections. She notes bulk of her pain was with with her back and to a lesser degree with peripheral arthritis.   Currently on Gabapentin 200mg TID, PRN Hydrocodone, voltaren and noting new regimen has offered her significant relief.   She denies swelling in joints, or AM stiffness.   Patient denies weight loss, rashes, dry eye, dry mouth, nasal or palatal ulcerations,  lymphadenopathy, Raynaud's, hx of DVT/miscarriages, psoriasis or family hx of psoriasis, rashes, serositis, anemia or other constitutional symptoms.      REVIEW OF SYSTEMS:    Review of Systems   Constitutional: Negative for fever, malaise/fatigue and weight loss.   HENT: Negative for sore throat.    Eyes: Negative for double vision, photophobia and redness.   Respiratory: Negative for cough, shortness of breath and wheezing.    Cardiovascular: Negative for chest pain, palpitations and orthopnea.   Gastrointestinal: Negative for abdominal pain, constipation and diarrhea.   Genitourinary: Negative for dysuria, hematuria and urgency.   Musculoskeletal: Positive for back pain and myalgias. Negative for joint pain.   Skin: Negative for rash.   Neurological: Negative for dizziness, tingling, focal weakness and headaches.   Endo/Heme/Allergies: Does  not bruise/bleed easily.   Psychiatric/Behavioral: Negative for depression, hallucinations and suicidal ideas.               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      calcium citrate-vitamin D3 315-200 mg (CITRACAL+D) 315-200 mg-unit per tablet Take 1 tablet by mouth once daily.      folic acid (FOLVITE) 1 MG tablet TAKE 1 TABLET EVERY MORNING *THANK YOU* *GOD BLESS* 90 tablet 3    furosemide (LASIX) 20 MG tablet TAKE 1 TABLET DAILY *THANK YOU* *GOD BLESS* 30 tablet 11    gabapentin (NEURONTIN) 100 MG capsule 200 mg po TID for L leg pain 180 capsule 11    garlic 1,000 mg Cap Take 500 mg by mouth once daily.       HYDROcodone-acetaminophen (NORCO)  mg per tablet 1/2 tab by mouth every 6 hours prn for pain 60 tablet 0    meclizine (ANTIVERT) 12.5 mg tablet Take 1 tablet (12.5 mg total) by mouth 3 (three) times daily as needed for Dizziness or Nausea. 30 tablet 11    metFORMIN (GLUCOPHAGE) 500 MG tablet Take 2 tablets (1,000 mg  total) by mouth 2 (two) times daily with meals. NOTE: dosage changed as of today. 360 tablet 3    multivitamin (THERAGRAN) per tablet Take 1 tablet by mouth once daily.      timolol 0.5 % ophthalmic solution Place 1 drop into both eyes 2 (two) times daily.      TRAVATAN Z 0.004 % Drop Place 1 drop into both eyes every Mon, Wed, Fri.  0    TRUEPLUS LANCETS 28 gauge Misc USE AS DIRECTED DAILY *THANK YOU* 100 each 3    atenolol (TENORMIN) 50 MG tablet TAKE 1 TABLET EVERY DAY *THANK YOU* *GOD BLESS* (Patient taking differently: Take 50 mg by mouth nightly. ) 30 tablet 11    blood sugar diagnostic (BLOOD GLUCOSE TEST) Strp 1 strip by Misc.(Non-Drug; Combo Route) route once daily. 100 strip 3    dorzolamide-timolol 2-0.5% (COSOPT) 22.3-6.8 mg/mL ophthalmic solution Place 1 drop into both eyes 2 (two) times daily.      traMADol (ULTRAM) 50 mg tablet TAKE (1) TABLET EVERY SIX HOURS AS NEEDED 40 tablet 0     Current Facility-Administered Medications on File Prior to Visit   Medication Dose Route Frequency Provider Last Rate Last Dose    lactated ringers infusion   Intravenous Continuous Kota Gallardo MD 30 mL/hr at 08/29/18 0630         Vitals:    11/13/18 1315   BP: (!) 161/78   Pulse: 95       Physical Exam:    Physical Exam   Constitutional: She is oriented to person, place, and time. She appears well-developed and well-nourished.   HENT:   Head: Normocephalic and atraumatic.   Mouth/Throat: Oropharynx is clear and moist.   Eyes: EOM are normal. Pupils are equal, round, and reactive to light.   Neck: Normal range of motion.   Cardiovascular: Normal rate, regular rhythm and normal heart sounds.   Pulmonary/Chest: Effort normal and breath sounds normal.   Musculoskeletal:        Right shoulder: She exhibits normal range of motion, no tenderness and no swelling.        Left shoulder: She exhibits normal range of motion, no tenderness and no swelling.        Right elbow: She exhibits normal range of motion and  no swelling. No tenderness found.        Left elbow: She exhibits normal range of motion and no swelling. No tenderness found.        Right wrist: She exhibits normal range of motion, no tenderness and no swelling.        Left wrist: She exhibits normal range of motion, no tenderness and no swelling.        Right knee: She exhibits normal range of motion and no swelling. No tenderness found.        Left knee: She exhibits normal range of motion and no swelling. No tenderness found.        Lumbar back: She exhibits decreased range of motion, tenderness and spasm.        Right hand: She exhibits normal range of motion, no tenderness and no swelling.        Left hand: She exhibits normal range of motion, no tenderness and no swelling.        Right foot: There is normal range of motion, no tenderness and no swelling.        Left foot: There is normal range of motion, no tenderness and no swelling.   Neurological: She is alert and oriented to person, place, and time.   Skin: Skin is warm and dry.   Psychiatric: She has a normal mood and affect. Her behavior is normal.             Assessment:       Encounter Diagnoses   Name Primary?    DDD (degenerative disc disease), lumbar Yes    Primary osteoarthritis involving multiple joints           Plan:        DDD (degenerative disc disease), lumbar    Primary osteoarthritis involving multiple joints    Patient seems to be doing well with regard to intervention with Dr. Gallardo. Nothing from a Rheumatologic perspective that I would be concerned with or would change.   Discussed her regimen with her and should there be any future questions not to hesitate to call.     No Follow-up on file.      40min consultation with greater than 50% spent in counseling, chart review and coordination of care. All questions answered.  Thank you for allowing me to participate in the care of this very pleasant patient.

## 2018-11-13 NOTE — LETTER
November 30, 2018      Alex Bejarano MD  [address] [facility] [address]           [facility]  [address] [facility] [address] [place] [address]  Phone: [phone]  Fax: [phone]          Patient: Lilia Chino   MR Number: [id number]   YOB: 1935   Date of Visit: 11/13/2018       Dear Dr. Alex Bejarano:    Thank you for referring Lilia Chino to me for evaluation. Attached you will find relevant portions of my assessment and plan of care.    If you have questions, please do not hesitate to call me. I look forward to following Lilia Chino along with you.    Sincerely,    Whitney Juarez, DO    Enclosure  CC:  No Recipients    If you would like to receive this communication electronically, please contact [email or web address] or [phone] to request more information on [email or web address] Link access.    For providers and/or their staff who would like to refer a patient to [facility], please contact us through our one-stop-shop provider referral line, [facility] , at [phone].    If you feel you have received this communication in error or would no longer like to receive these types of communications, please e-mail [email or web address]

## 2019-02-12 ENCOUNTER — LAB VISIT (OUTPATIENT)
Dept: LAB | Facility: HOSPITAL | Age: 84
End: 2019-02-12
Attending: INTERNAL MEDICINE
Payer: MEDICARE

## 2019-02-12 DIAGNOSIS — D47.2 MGUS (MONOCLONAL GAMMOPATHY OF UNKNOWN SIGNIFICANCE): ICD-10-CM

## 2019-02-12 LAB
ABO + RH BLD: NORMAL
BASOPHILS # BLD AUTO: 0.05 K/UL
BASOPHILS NFR BLD: 0.6 %
BLD GP AB SCN CELLS X3 SERPL QL: NORMAL
DIFFERENTIAL METHOD: ABNORMAL
EOSINOPHIL # BLD AUTO: 0.1 K/UL
EOSINOPHIL NFR BLD: 0.7 %
ERYTHROCYTE [DISTWIDTH] IN BLOOD BY AUTOMATED COUNT: 18.4 %
HCT VFR BLD AUTO: 28 %
HGB BLD-MCNC: 8.1 G/DL
IMM GRANULOCYTES # BLD AUTO: 0.05 K/UL
IMM GRANULOCYTES NFR BLD AUTO: 0.6 %
LYMPHOCYTES # BLD AUTO: 1.9 K/UL
LYMPHOCYTES NFR BLD: 21.9 %
MCH RBC QN AUTO: 21.1 PG
MCHC RBC AUTO-ENTMCNC: 28.9 G/DL
MCV RBC AUTO: 73 FL
MONOCYTES # BLD AUTO: 0.7 K/UL
MONOCYTES NFR BLD: 8 %
NEUTROPHILS # BLD AUTO: 5.8 K/UL
NEUTROPHILS NFR BLD: 68.2 %
NRBC BLD-RTO: 0 /100 WBC
PLATELET # BLD AUTO: 420 K/UL
PMV BLD AUTO: 8.2 FL
RBC # BLD AUTO: 3.83 M/UL
WBC # BLD AUTO: 8.54 K/UL

## 2019-02-12 PROCEDURE — 85025 COMPLETE CBC W/AUTO DIFF WBC: CPT

## 2019-02-12 PROCEDURE — 86850 RBC ANTIBODY SCREEN: CPT | Mod: PN

## 2019-02-12 PROCEDURE — 86850 RBC ANTIBODY SCREEN: CPT

## 2019-02-12 PROCEDURE — 85025 COMPLETE CBC W/AUTO DIFF WBC: CPT | Mod: PN

## 2019-02-12 PROCEDURE — 36415 COLL VENOUS BLD VENIPUNCTURE: CPT | Mod: PN

## 2019-02-21 PROBLEM — D64.9 ABSOLUTE ANEMIA: Status: ACTIVE | Noted: 2019-02-21

## 2019-09-12 ENCOUNTER — LAB VISIT (OUTPATIENT)
Dept: LAB | Facility: HOSPITAL | Age: 84
End: 2019-09-12
Attending: INTERNAL MEDICINE
Payer: MEDICARE

## 2019-09-12 DIAGNOSIS — R30.0 DYSURIA: ICD-10-CM

## 2019-09-12 DIAGNOSIS — D47.2 MGUS (MONOCLONAL GAMMOPATHY OF UNKNOWN SIGNIFICANCE): ICD-10-CM

## 2019-09-12 LAB
ALBUMIN SERPL BCP-MCNC: 4.1 G/DL (ref 3.5–5.2)
ALP SERPL-CCNC: 71 U/L (ref 38–145)
ALT SERPL W/O P-5'-P-CCNC: <6 U/L (ref 10–44)
ANION GAP SERPL CALC-SCNC: 10 MMOL/L (ref 8–16)
AST SERPL-CCNC: 14 U/L (ref 14–36)
B2 MICROGLOB SERPL-MCNC: 1.9 UG/ML (ref 0–2.5)
BACTERIA #/AREA URNS HPF: NEGATIVE /HPF
BACTERIA #/AREA URNS HPF: NEGATIVE /HPF
BASOPHILS # BLD AUTO: 0.05 K/UL (ref 0–0.2)
BASOPHILS NFR BLD: 0.8 % (ref 0–1.9)
BILIRUB SERPL-MCNC: 0.2 MG/DL (ref 0.2–1.3)
BILIRUB UR QL STRIP: NEGATIVE
BUN SERPL-MCNC: 14 MG/DL (ref 7–18)
CALCIUM SERPL-MCNC: 9.1 MG/DL (ref 8.4–10.2)
CHLORIDE SERPL-SCNC: 105 MMOL/L (ref 95–110)
CLARITY UR: CLEAR
CO2 SERPL-SCNC: 25 MMOL/L (ref 22–31)
COLOR UR: YELLOW
CREAT SERPL-MCNC: 0.66 MG/DL (ref 0.5–1.4)
DIFFERENTIAL METHOD: ABNORMAL
EOSINOPHIL # BLD AUTO: 0.1 K/UL (ref 0–0.5)
EOSINOPHIL NFR BLD: 1.7 % (ref 0–8)
ERYTHROCYTE [DISTWIDTH] IN BLOOD BY AUTOMATED COUNT: 16.4 % (ref 11.5–14.5)
EST. GFR  (AFRICAN AMERICAN): >60 ML/MIN/1.73 M^2
EST. GFR  (NON AFRICAN AMERICAN): >60 ML/MIN/1.73 M^2
GLUCOSE SERPL-MCNC: 115 MG/DL (ref 70–110)
GLUCOSE UR QL STRIP: NEGATIVE
HCT VFR BLD AUTO: 32.8 % (ref 37–48.5)
HGB BLD-MCNC: 10.2 G/DL (ref 12–16)
HGB UR QL STRIP: ABNORMAL
HYALINE CASTS #/AREA URNS LPF: 0 /LPF (ref 0–1)
HYALINE CASTS #/AREA URNS LPF: 0 /LPF (ref 0–1)
IGA SERPL-MCNC: 242 MG/DL (ref 40–350)
IGG SERPL-MCNC: 1598 MG/DL (ref 650–1600)
IGM SERPL-MCNC: 64 MG/DL (ref 50–300)
IMM GRANULOCYTES # BLD AUTO: 0.03 K/UL (ref 0–0.04)
IMM GRANULOCYTES NFR BLD AUTO: 0.5 % (ref 0–0.5)
KETONES UR QL STRIP: NEGATIVE
LDH SERPL L TO P-CCNC: 331 U/L (ref 313–618)
LEUKOCYTE ESTERASE UR QL STRIP: ABNORMAL
LYMPHOCYTES # BLD AUTO: 1.7 K/UL (ref 1–4.8)
LYMPHOCYTES NFR BLD: 25.7 % (ref 18–48)
MAGNESIUM SERPL-MCNC: 1.2 MG/DL (ref 1.6–2.6)
MCH RBC QN AUTO: 24.8 PG (ref 27–31)
MCHC RBC AUTO-ENTMCNC: 31.1 G/DL (ref 32–36)
MCV RBC AUTO: 80 FL (ref 82–98)
MICROSCOPIC COMMENT: ABNORMAL
MONOCYTES # BLD AUTO: 0.5 K/UL (ref 0.3–1)
MONOCYTES NFR BLD: 8 % (ref 4–15)
NEUTROPHILS # BLD AUTO: 4.1 K/UL (ref 1.8–7.7)
NEUTROPHILS NFR BLD: 63.3 % (ref 38–73)
NITRITE UR QL STRIP: NEGATIVE
NRBC BLD-RTO: 0 /100 WBC
PH UR STRIP: 6 [PH] (ref 5–8)
PLATELET # BLD AUTO: 316 K/UL (ref 150–350)
PMV BLD AUTO: 8.8 FL (ref 9.2–12.9)
POTASSIUM SERPL-SCNC: 4 MMOL/L (ref 3.5–5.1)
PROT SERPL-MCNC: 7.8 G/DL (ref 6–8.4)
PROT UR QL STRIP: ABNORMAL
RBC # BLD AUTO: 4.12 M/UL (ref 4–5.4)
RBC #/AREA URNS HPF: 3 /HPF (ref 0–4)
RBC #/AREA URNS HPF: 3 /HPF (ref 0–4)
SODIUM SERPL-SCNC: 140 MMOL/L (ref 136–145)
SP GR UR STRIP: 1.02 (ref 1–1.03)
SQUAMOUS #/AREA URNS HPF: 2 /HPF
SQUAMOUS #/AREA URNS HPF: 2 /HPF
URN SPEC COLLECT METH UR: ABNORMAL
UROBILINOGEN UR STRIP-ACNC: 1 EU/DL
WBC # BLD AUTO: 6.41 K/UL (ref 3.9–12.7)
WBC #/AREA URNS HPF: 10 /HPF (ref 0–5)
WBC #/AREA URNS HPF: 10 /HPF (ref 0–5)

## 2019-09-12 PROCEDURE — 84165 PROTEIN E-PHORESIS SERUM: CPT | Mod: 26,,, | Performed by: PATHOLOGY

## 2019-09-12 PROCEDURE — 82784 ASSAY IGA/IGD/IGG/IGM EACH: CPT | Mod: 59

## 2019-09-12 PROCEDURE — 80053 COMPREHEN METABOLIC PANEL: CPT

## 2019-09-12 PROCEDURE — 81001 URINALYSIS AUTO W/SCOPE: CPT | Mod: PN

## 2019-09-12 PROCEDURE — 81001 URINALYSIS AUTO W/SCOPE: CPT

## 2019-09-12 PROCEDURE — 82232 ASSAY OF BETA-2 PROTEIN: CPT

## 2019-09-12 PROCEDURE — 83615 LACTATE (LD) (LDH) ENZYME: CPT

## 2019-09-12 PROCEDURE — 86334 IMMUNOFIX E-PHORESIS SERUM: CPT | Mod: 26,,, | Performed by: PATHOLOGY

## 2019-09-12 PROCEDURE — 83735 ASSAY OF MAGNESIUM: CPT | Mod: PN

## 2019-09-12 PROCEDURE — 83520 IMMUNOASSAY QUANT NOS NONAB: CPT

## 2019-09-12 PROCEDURE — 86334 PATHOLOGIST INTERPRETATION IFE: ICD-10-PCS | Mod: 26,,, | Performed by: PATHOLOGY

## 2019-09-12 PROCEDURE — 84165 PROTEIN E-PHORESIS SERUM: CPT

## 2019-09-12 PROCEDURE — 83615 LACTATE (LD) (LDH) ENZYME: CPT | Mod: PN

## 2019-09-12 PROCEDURE — 84165 PATHOLOGIST INTERPRETATION SPE: ICD-10-PCS | Mod: 26,,, | Performed by: PATHOLOGY

## 2019-09-12 PROCEDURE — 86334 IMMUNOFIX E-PHORESIS SERUM: CPT

## 2019-09-12 PROCEDURE — 85025 COMPLETE CBC W/AUTO DIFF WBC: CPT | Mod: PN

## 2019-09-12 PROCEDURE — 83735 ASSAY OF MAGNESIUM: CPT

## 2019-09-12 PROCEDURE — 36415 COLL VENOUS BLD VENIPUNCTURE: CPT | Mod: PN

## 2019-09-12 PROCEDURE — 85025 COMPLETE CBC W/AUTO DIFF WBC: CPT

## 2019-09-12 PROCEDURE — 80053 COMPREHEN METABOLIC PANEL: CPT | Mod: PN

## 2019-09-13 LAB
ALBUMIN SERPL ELPH-MCNC: 3.52 G/DL (ref 3.35–5.55)
ALPHA1 GLOB SERPL ELPH-MCNC: 0.39 G/DL (ref 0.17–0.41)
ALPHA2 GLOB SERPL ELPH-MCNC: 0.82 G/DL (ref 0.43–0.99)
B-GLOBULIN SERPL ELPH-MCNC: 0.89 G/DL (ref 0.5–1.1)
GAMMA GLOB SERPL ELPH-MCNC: 1.48 G/DL (ref 0.67–1.58)
INTERPRETATION SERPL IFE-IMP: NORMAL
KAPPA LC SER QL IA: 3.71 MG/DL (ref 0.33–1.94)
KAPPA LC/LAMBDA SER IA: 1.14 (ref 0.26–1.65)
LAMBDA LC SER QL IA: 3.26 MG/DL (ref 0.57–2.63)
PATHOLOGIST INTERPRETATION IFE: NORMAL
PATHOLOGIST INTERPRETATION SPE: NORMAL
PROT SERPL-MCNC: 7.1 G/DL (ref 6–8.4)

## 2019-11-06 ENCOUNTER — LAB VISIT (OUTPATIENT)
Dept: LAB | Facility: HOSPITAL | Age: 84
End: 2019-11-06
Attending: PHYSICIAN ASSISTANT
Payer: MEDICARE

## 2019-11-06 DIAGNOSIS — D47.2 MGUS (MONOCLONAL GAMMOPATHY OF UNKNOWN SIGNIFICANCE): ICD-10-CM

## 2019-11-06 LAB
PROT 24H UR-MRATE: 140 MG/SPEC (ref 0–100)
PROT UR-MCNC: 7 MG/DL (ref 0–15)
URINE COLLECTION DURATION: 24 HR
URINE VOLUME: 2000 ML

## 2019-11-06 PROCEDURE — 84156 ASSAY OF PROTEIN URINE: CPT

## 2019-11-06 PROCEDURE — 84166 PROTEIN E-PHORESIS/URINE/CSF: CPT | Mod: 26,,, | Performed by: PATHOLOGY

## 2019-11-06 PROCEDURE — 84166 PATHOLOGIST INTERPRETATION UPE: ICD-10-PCS | Mod: 26,,, | Performed by: PATHOLOGY

## 2019-11-06 PROCEDURE — 84166 PROTEIN E-PHORESIS/URINE/CSF: CPT

## 2019-11-07 LAB
PATHOLOGIST INTERPRETATION UPE: NORMAL
PROT PATTERN UR ELPH-IMP: NORMAL

## 2019-11-13 PROBLEM — Z95.0 PACEMAKER: Status: ACTIVE | Noted: 2019-11-13

## 2019-11-13 PROBLEM — H40.9 GLAUCOMA: Status: ACTIVE | Noted: 2019-11-13

## 2019-12-09 PROBLEM — M81.0 AGE RELATED OSTEOPOROSIS: Status: ACTIVE | Noted: 2019-12-09

## 2019-12-12 ENCOUNTER — LAB VISIT (OUTPATIENT)
Dept: LAB | Facility: HOSPITAL | Age: 84
End: 2019-12-12
Attending: PHYSICIAN ASSISTANT
Payer: MEDICARE

## 2019-12-12 DIAGNOSIS — D47.2 MGUS (MONOCLONAL GAMMOPATHY OF UNKNOWN SIGNIFICANCE): ICD-10-CM

## 2019-12-12 DIAGNOSIS — D47.2 SMOLDERING MYELOMA: ICD-10-CM

## 2019-12-12 LAB
ALBUMIN SERPL BCP-MCNC: 4.1 G/DL (ref 3.5–5.2)
ALP SERPL-CCNC: 71 U/L (ref 38–145)
ALT SERPL W/O P-5'-P-CCNC: 10 U/L (ref 10–44)
ANION GAP SERPL CALC-SCNC: 9 MMOL/L (ref 8–16)
AST SERPL-CCNC: 14 U/L (ref 14–36)
B2 MICROGLOB SERPL-MCNC: 2.1 UG/ML (ref 0–2.5)
BASOPHILS # BLD AUTO: 0.07 K/UL (ref 0–0.2)
BASOPHILS NFR BLD: 0.9 % (ref 0–1.9)
BILIRUB SERPL-MCNC: 0.2 MG/DL (ref 0.2–1.3)
BUN SERPL-MCNC: 12 MG/DL (ref 7–18)
CALCIUM SERPL-MCNC: 9.5 MG/DL (ref 8.4–10.2)
CHLORIDE SERPL-SCNC: 103 MMOL/L (ref 95–110)
CO2 SERPL-SCNC: 27 MMOL/L (ref 22–31)
CREAT SERPL-MCNC: 0.64 MG/DL (ref 0.5–1.4)
CRP SERPL-MCNC: 2.2 MG/DL (ref 0–0.9)
DIFFERENTIAL METHOD: ABNORMAL
EOSINOPHIL # BLD AUTO: 0.1 K/UL (ref 0–0.5)
EOSINOPHIL NFR BLD: 0.9 % (ref 0–8)
ERYTHROCYTE [DISTWIDTH] IN BLOOD BY AUTOMATED COUNT: 15.9 % (ref 11.5–14.5)
EST. GFR  (AFRICAN AMERICAN): >60 ML/MIN/1.73 M^2
EST. GFR  (NON AFRICAN AMERICAN): >60 ML/MIN/1.73 M^2
GLUCOSE SERPL-MCNC: 124 MG/DL (ref 70–110)
HCT VFR BLD AUTO: 34 % (ref 37–48.5)
HGB BLD-MCNC: 10.2 G/DL (ref 12–16)
IGA SERPL-MCNC: 254 MG/DL (ref 40–350)
IGG SERPL-MCNC: 1635 MG/DL (ref 650–1600)
IGM SERPL-MCNC: 64 MG/DL (ref 50–300)
IMM GRANULOCYTES # BLD AUTO: 0.03 K/UL (ref 0–0.04)
IMM GRANULOCYTES NFR BLD AUTO: 0.4 % (ref 0–0.5)
LDH SERPL L TO P-CCNC: 378 U/L (ref 313–618)
LYMPHOCYTES # BLD AUTO: 2.1 K/UL (ref 1–4.8)
LYMPHOCYTES NFR BLD: 27 % (ref 18–48)
MCH RBC QN AUTO: 24.1 PG (ref 27–31)
MCHC RBC AUTO-ENTMCNC: 30 G/DL (ref 32–36)
MCV RBC AUTO: 80 FL (ref 82–98)
MONOCYTES # BLD AUTO: 0.5 K/UL (ref 0.3–1)
MONOCYTES NFR BLD: 6.5 % (ref 4–15)
NEUTROPHILS # BLD AUTO: 4.9 K/UL (ref 1.8–7.7)
NEUTROPHILS NFR BLD: 64.3 % (ref 38–73)
NRBC BLD-RTO: 0 /100 WBC
PLATELET # BLD AUTO: 323 K/UL (ref 150–350)
PMV BLD AUTO: 9.1 FL (ref 9.2–12.9)
POTASSIUM SERPL-SCNC: 4.5 MMOL/L (ref 3.5–5.1)
PROT SERPL-MCNC: 7.9 G/DL (ref 6–8.4)
RBC # BLD AUTO: 4.24 M/UL (ref 4–5.4)
SODIUM SERPL-SCNC: 139 MMOL/L (ref 136–145)
WBC # BLD AUTO: 7.67 K/UL (ref 3.9–12.7)

## 2019-12-12 PROCEDURE — 84165 PATHOLOGIST INTERPRETATION SPE: ICD-10-PCS | Mod: 26,,, | Performed by: PATHOLOGY

## 2019-12-12 PROCEDURE — 86334 PATHOLOGIST INTERPRETATION IFE: ICD-10-PCS | Mod: 26,,, | Performed by: PATHOLOGY

## 2019-12-12 PROCEDURE — 82232 ASSAY OF BETA-2 PROTEIN: CPT

## 2019-12-12 PROCEDURE — 84165 PROTEIN E-PHORESIS SERUM: CPT

## 2019-12-12 PROCEDURE — 36415 COLL VENOUS BLD VENIPUNCTURE: CPT | Mod: PN

## 2019-12-12 PROCEDURE — 82784 ASSAY IGA/IGD/IGG/IGM EACH: CPT | Mod: 59

## 2019-12-12 PROCEDURE — 84165 PROTEIN E-PHORESIS SERUM: CPT | Mod: 26,,, | Performed by: PATHOLOGY

## 2019-12-12 PROCEDURE — 86140 C-REACTIVE PROTEIN: CPT | Mod: PN

## 2019-12-12 PROCEDURE — 83615 LACTATE (LD) (LDH) ENZYME: CPT | Mod: PN

## 2019-12-12 PROCEDURE — 86334 IMMUNOFIX E-PHORESIS SERUM: CPT

## 2019-12-12 PROCEDURE — 85025 COMPLETE CBC W/AUTO DIFF WBC: CPT

## 2019-12-12 PROCEDURE — 80053 COMPREHEN METABOLIC PANEL: CPT

## 2019-12-12 PROCEDURE — 86140 C-REACTIVE PROTEIN: CPT

## 2019-12-12 PROCEDURE — 83615 LACTATE (LD) (LDH) ENZYME: CPT

## 2019-12-12 PROCEDURE — 85025 COMPLETE CBC W/AUTO DIFF WBC: CPT | Mod: PN

## 2019-12-12 PROCEDURE — 83520 IMMUNOASSAY QUANT NOS NONAB: CPT

## 2019-12-12 PROCEDURE — 80053 COMPREHEN METABOLIC PANEL: CPT | Mod: PN

## 2019-12-12 PROCEDURE — 86334 IMMUNOFIX E-PHORESIS SERUM: CPT | Mod: 26,,, | Performed by: PATHOLOGY

## 2019-12-13 LAB
ALBUMIN SERPL ELPH-MCNC: 3.34 G/DL (ref 3.35–5.55)
ALPHA1 GLOB SERPL ELPH-MCNC: 0.55 G/DL (ref 0.17–0.41)
ALPHA2 GLOB SERPL ELPH-MCNC: 0.87 G/DL (ref 0.43–0.99)
B-GLOBULIN SERPL ELPH-MCNC: 0.89 G/DL (ref 0.5–1.1)
GAMMA GLOB SERPL ELPH-MCNC: 1.46 G/DL (ref 0.67–1.58)
INTERPRETATION SERPL IFE-IMP: NORMAL
KAPPA LC SER QL IA: 3.68 MG/DL (ref 0.33–1.94)
KAPPA LC/LAMBDA SER IA: 1.05 (ref 0.26–1.65)
LAMBDA LC SER QL IA: 3.52 MG/DL (ref 0.57–2.63)
PATHOLOGIST INTERPRETATION IFE: NORMAL
PATHOLOGIST INTERPRETATION SPE: NORMAL
PROT SERPL-MCNC: 7.1 G/DL (ref 6–8.4)

## 2020-03-13 ENCOUNTER — LAB VISIT (OUTPATIENT)
Dept: LAB | Facility: HOSPITAL | Age: 85
End: 2020-03-13
Attending: PHYSICIAN ASSISTANT
Payer: MEDICARE

## 2020-03-13 DIAGNOSIS — D47.2 MGUS (MONOCLONAL GAMMOPATHY OF UNKNOWN SIGNIFICANCE): ICD-10-CM

## 2020-03-13 LAB
ALBUMIN SERPL BCP-MCNC: 3.7 G/DL (ref 3.5–5.2)
ALP SERPL-CCNC: 65 U/L (ref 38–145)
ALT SERPL W/O P-5'-P-CCNC: 8 U/L (ref 0–35)
ANION GAP SERPL CALC-SCNC: 7 MMOL/L (ref 8–16)
AST SERPL-CCNC: 13 U/L (ref 14–36)
BASOPHILS # BLD AUTO: 0.05 K/UL (ref 0–0.2)
BASOPHILS NFR BLD: 0.7 % (ref 0–1.9)
BILIRUB SERPL-MCNC: 0.1 MG/DL (ref 0.2–1.3)
BUN SERPL-MCNC: 13 MG/DL (ref 7–18)
CALCIUM SERPL-MCNC: 8.9 MG/DL (ref 8.4–10.2)
CHLORIDE SERPL-SCNC: 103 MMOL/L (ref 95–110)
CO2 SERPL-SCNC: 27 MMOL/L (ref 22–31)
CREAT SERPL-MCNC: 0.63 MG/DL (ref 0.5–1.4)
CRP SERPL-MCNC: 3.4 MG/DL (ref 0–0.9)
DIFFERENTIAL METHOD: ABNORMAL
EOSINOPHIL # BLD AUTO: 0.1 K/UL (ref 0–0.5)
EOSINOPHIL NFR BLD: 1.1 % (ref 0–8)
ERYTHROCYTE [DISTWIDTH] IN BLOOD BY AUTOMATED COUNT: 16.7 % (ref 11.5–14.5)
EST. GFR  (AFRICAN AMERICAN): >60 ML/MIN/1.73 M^2
EST. GFR  (NON AFRICAN AMERICAN): >60 ML/MIN/1.73 M^2
GLUCOSE SERPL-MCNC: 112 MG/DL (ref 70–110)
HCT VFR BLD AUTO: 30.6 % (ref 37–48.5)
HGB BLD-MCNC: 9.1 G/DL (ref 12–16)
IGA SERPL-MCNC: 216 MG/DL (ref 40–350)
IGG SERPL-MCNC: 1549 MG/DL (ref 650–1600)
IGM SERPL-MCNC: 57 MG/DL (ref 50–300)
IMM GRANULOCYTES # BLD AUTO: 0.03 K/UL (ref 0–0.04)
IMM GRANULOCYTES NFR BLD AUTO: 0.4 % (ref 0–0.5)
LDH SERPL L TO P-CCNC: 301 U/L (ref 313–618)
LYMPHOCYTES # BLD AUTO: 2.1 K/UL (ref 1–4.8)
LYMPHOCYTES NFR BLD: 29.4 % (ref 18–48)
MCH RBC QN AUTO: 23.6 PG (ref 27–31)
MCHC RBC AUTO-ENTMCNC: 29.7 G/DL (ref 32–36)
MCV RBC AUTO: 79 FL (ref 82–98)
MONOCYTES # BLD AUTO: 0.5 K/UL (ref 0.3–1)
MONOCYTES NFR BLD: 7.5 % (ref 4–15)
NEUTROPHILS # BLD AUTO: 4.4 K/UL (ref 1.8–7.7)
NEUTROPHILS NFR BLD: 60.9 % (ref 38–73)
NRBC BLD-RTO: 0 /100 WBC
PLATELET # BLD AUTO: 302 K/UL (ref 150–350)
PMV BLD AUTO: 8.6 FL (ref 9.2–12.9)
POTASSIUM SERPL-SCNC: 4.5 MMOL/L (ref 3.5–5.1)
PROT SERPL-MCNC: 7.3 G/DL (ref 6–8.4)
RBC # BLD AUTO: 3.86 M/UL (ref 4–5.4)
SODIUM SERPL-SCNC: 137 MMOL/L (ref 136–145)
WBC # BLD AUTO: 7.18 K/UL (ref 3.9–12.7)

## 2020-03-13 PROCEDURE — 86140 C-REACTIVE PROTEIN: CPT

## 2020-03-13 PROCEDURE — 86334 IMMUNOFIX E-PHORESIS SERUM: CPT

## 2020-03-13 PROCEDURE — 36415 COLL VENOUS BLD VENIPUNCTURE: CPT | Mod: PN

## 2020-03-13 PROCEDURE — 83615 LACTATE (LD) (LDH) ENZYME: CPT | Mod: PN

## 2020-03-13 PROCEDURE — 83520 IMMUNOASSAY QUANT NOS NONAB: CPT

## 2020-03-13 PROCEDURE — 84165 PROTEIN E-PHORESIS SERUM: CPT

## 2020-03-13 PROCEDURE — 85025 COMPLETE CBC W/AUTO DIFF WBC: CPT | Mod: PN

## 2020-03-13 PROCEDURE — 83615 LACTATE (LD) (LDH) ENZYME: CPT

## 2020-03-13 PROCEDURE — 80053 COMPREHEN METABOLIC PANEL: CPT | Mod: PN

## 2020-03-13 PROCEDURE — 86140 C-REACTIVE PROTEIN: CPT | Mod: PN

## 2020-03-13 PROCEDURE — 86334 IMMUNOFIX E-PHORESIS SERUM: CPT | Mod: 26,,, | Performed by: PATHOLOGY

## 2020-03-13 PROCEDURE — 85025 COMPLETE CBC W/AUTO DIFF WBC: CPT

## 2020-03-13 PROCEDURE — 80053 COMPREHEN METABOLIC PANEL: CPT

## 2020-03-13 PROCEDURE — 84165 PATHOLOGIST INTERPRETATION SPE: ICD-10-PCS | Mod: 26,,, | Performed by: PATHOLOGY

## 2020-03-13 PROCEDURE — 82784 ASSAY IGA/IGD/IGG/IGM EACH: CPT | Mod: 59

## 2020-03-13 PROCEDURE — 86334 PATHOLOGIST INTERPRETATION IFE: ICD-10-PCS | Mod: 26,,, | Performed by: PATHOLOGY

## 2020-03-13 PROCEDURE — 84165 PROTEIN E-PHORESIS SERUM: CPT | Mod: 26,,, | Performed by: PATHOLOGY

## 2020-03-16 LAB
ALBUMIN SERPL ELPH-MCNC: 3.14 G/DL (ref 3.35–5.55)
ALPHA1 GLOB SERPL ELPH-MCNC: 0.5 G/DL (ref 0.17–0.41)
ALPHA2 GLOB SERPL ELPH-MCNC: 0.77 G/DL (ref 0.43–0.99)
B-GLOBULIN SERPL ELPH-MCNC: 0.83 G/DL (ref 0.5–1.1)
GAMMA GLOB SERPL ELPH-MCNC: 1.37 G/DL (ref 0.67–1.58)
INTERPRETATION SERPL IFE-IMP: NORMAL
KAPPA LC SER QL IA: 3.47 MG/DL (ref 0.33–1.94)
KAPPA LC/LAMBDA SER IA: 1.08 (ref 0.26–1.65)
LAMBDA LC SER QL IA: 3.21 MG/DL (ref 0.57–2.63)
PATHOLOGIST INTERPRETATION IFE: NORMAL
PATHOLOGIST INTERPRETATION SPE: NORMAL
PROT SERPL-MCNC: 6.6 G/DL (ref 6–8.4)

## 2021-03-30 PROBLEM — R09.89 RIGHT CAROTID BRUIT: Status: ACTIVE | Noted: 2021-03-30

## 2021-05-13 PROBLEM — D47.2 SMOLDERING MYELOMA: Status: ACTIVE | Noted: 2021-05-13

## 2021-07-01 ENCOUNTER — PATIENT MESSAGE (OUTPATIENT)
Dept: ADMINISTRATIVE | Facility: OTHER | Age: 86
End: 2021-07-01

## 2021-12-02 ENCOUNTER — PATIENT MESSAGE (OUTPATIENT)
Dept: RESEARCH | Facility: HOSPITAL | Age: 86
End: 2021-12-02
Payer: MEDICARE

## 2022-02-22 DIAGNOSIS — D84.9 IMMUNOSUPPRESSED STATUS: ICD-10-CM

## 2022-12-14 PROBLEM — Z71.89 ACP (ADVANCE CARE PLANNING): Status: ACTIVE | Noted: 2022-12-14

## 2022-12-14 PROBLEM — D68.69 HYPERCOAGULABLE STATE ASSOCIATED WITH COVID-19: Status: ACTIVE | Noted: 2022-12-14

## 2022-12-14 PROBLEM — G93.49 ENCEPHALOPATHY DUE TO COVID-19 VIRUS: Status: ACTIVE | Noted: 2022-12-14

## 2022-12-14 PROBLEM — I21.A1 TYPE 2 MI (MYOCARDIAL INFARCTION): Status: ACTIVE | Noted: 2022-12-14

## 2022-12-14 PROBLEM — E83.42 HYPOMAGNESEMIA: Status: ACTIVE | Noted: 2022-12-14

## 2022-12-14 PROBLEM — U07.1 HYPERCOAGULABLE STATE ASSOCIATED WITH COVID-19: Status: ACTIVE | Noted: 2022-12-14

## 2022-12-14 PROBLEM — R00.0 SINUS TACHYCARDIA: Status: ACTIVE | Noted: 2022-12-14

## 2022-12-14 PROBLEM — U07.1 ENCEPHALOPATHY DUE TO COVID-19 VIRUS: Status: ACTIVE | Noted: 2022-12-14

## 2022-12-14 PROBLEM — U07.1 COVID-19: Status: ACTIVE | Noted: 2022-12-14

## 2023-03-20 PROBLEM — I21.A1 TYPE 2 MI (MYOCARDIAL INFARCTION): Status: RESOLVED | Noted: 2022-12-14 | Resolved: 2023-03-20

## 2023-04-17 PROBLEM — I48.0 PAROXYSMAL ATRIAL FIBRILLATION: Status: ACTIVE | Noted: 2023-04-17

## 2023-05-02 PROBLEM — I47.10 PAROXYSMAL SVT (SUPRAVENTRICULAR TACHYCARDIA): Status: ACTIVE | Noted: 2023-05-02

## 2023-05-02 PROBLEM — I48.0 PAROXYSMAL ATRIAL FIBRILLATION: Status: RESOLVED | Noted: 2023-04-17 | Resolved: 2023-05-02

## 2023-05-02 PROBLEM — R82.71 ASYMPTOMATIC BACTERIURIA: Status: ACTIVE | Noted: 2023-05-02

## 2023-05-04 PROBLEM — I65.21 STENOSIS OF RIGHT CAROTID ARTERY: Status: ACTIVE | Noted: 2023-05-04

## 2023-05-09 ENCOUNTER — OUTPATIENT CASE MANAGEMENT (OUTPATIENT)
Dept: ADMINISTRATIVE | Facility: OTHER | Age: 88
End: 2023-05-09
Payer: MEDICARE

## 2023-05-09 NOTE — LETTER
May 9, 2023             Dear Lilia    Welcome to [facility] Complex Care Management Program.  It was a pleasure talking with you today.  My name is Rima Sainz, and I look forward to being your Care Manager.  My goal is to help you function at the healthiest and highest level possible.  You can contact me directly at [phone].    As an [facility] patient, some of the services we may be able to provide include:      Development of an individualized care plan with a Registered Nurse    Connection with a    Connection with available resources and services     Coordinate communication among your care team members    Provide coaching and education    Help you understand your doctors treatment plan   Help you obtain information about your insurance coverage.     All services provided by [facility]s Complex Care Managers and other care team members are coordinated with and communicated to your primary care team.      As part of your enrollment, you will be receiving education materials and more information about these services in your [facility] account, by phone or through the mail.  If you do not wish to participate or receive information, please contact our office at [phone].      Sincerely,        Rima Sainz, RN  [facility]   Out-patient RN Complex Care Manager

## 2023-05-09 NOTE — PROGRESS NOTES
"Outpatient Care Management  Initial Patient Assessment    Patient: Lilia Chino  MRN: [id number]  Date of Service: 05/09/2023  Completed by: Rima Sainz RN  Referral Date: 05/02/2023  Program: High Risk  Status: Identified  Start Date: 5/3/2023  Responsible Staff: No information to display        Reason for Visit   Patient presents with    OPCM Enrollment Call    Nursing Assessment       Brief Summary:  Lilia Chino was referred by Dr. Keny Levine for dehydration. Patient qualifies for program based on high risk score of 61/8%.   Active problem list, medical, surgical and social history reviewed. Active comorbidities include DM2, osteoarthritis, spinal stenosis, pacemake, SSS, HLD, tachycardia, PSVT. Areas of need identified by patient include dizziness.   Ms. Chino was informed that CHW will be contacting her regarding SDOH, likely within 2 or 3 days. She verbalized an understanding.  Next steps:   Ms. Chino agreed to OPCM RN follow up on or around 5/25/23.  Review blood pressure record.  Follow up regarding recommendations/plans post PCP and cardio visit.  Assess for falls history; instructed grandson to contact OPCM RN in 2 days if no update on BSC delivery. Sent message to CHW to follow up as well during SDOH questionnaire call.    Disability Status  Hearing Difficulty or Deaf: no  Visual Difficulty or Blind: yes  Vision Management: Other (glasses)  Difficulty Concentrating, Remembering or Making Decisions: no  Communication Difficulty: no  Eating/Swallowing Difficulty: no  Walking or Climbing Stairs Difficulty: yes  Walking or Climbing Stairs: ambulation difficulty, requires equipment  Mobility Management: walker  Dressing/Bathing Difficulty: no  Difficulty Managing Errands Independently: yes  Errands Management: family assists with errands  Equipment Currently Used at Home: walker, rolling; glucometer (Pt.'s son report RW is "old" and she needs another one)  Change in Functional Status Since Onset " of Current Illness/Injury: no        Spiritual Beliefs  Spiritual, Cultural Beliefs, Anabaptist Practices, Values that Affect Care: no      Social History     Socioeconomic History    Marital status:    Tobacco Use    Smoking status: Never    Smokeless tobacco: Never   Substance and Sexual Activity    Alcohol use: No     Alcohol/week: 0.0 standard drinks    Drug use: No    Sexual activity: Never   Social History Narrative    ** Merged History Encounter **            Roles and Relationships  Primary Source of Support/Comfort: child(pat)  Name of Support/Comfort Primary Source: Jn Chino      Advance Directives (For Healthcare)  Advance Directive  (If Adv Dir status is received, view document under Adv Dir in header or Chart Review Media tab): Patient does not have Advance Directive, requests information.  Patient Requests Assistance: Place consult order to /        Patient Reported Insurance  Verified current insurance plan:: Medicare; Other (see comment) ([facility])        Depression Patient Health Questionnaire 4/17/2023 4/19/2022 2/21/2019 1/10/2018 7/20/2017 6/28/2016   Over the last two weeks how often have you been bothered by little interest or pleasure in doing things Not at all Several days Not at all Not at all Not at all Several days   Over the last two weeks how often have you been bothered by feeling down, depressed or hopeless Not at all Not at all Not at all Not at all Several days Several days   PHQ-2 Total Score 0 1 0 0 1 2       Learning Assessment       05/02/2023 0245 [facility] (5/1/2023 - 5/5/2023)   Created by Dania Briscoe RN - RN (Nurse) Status: Complete                 PRIMARY LEARNER     Primary Learner Name:  Lilia Derase TH - 05/02/2023 0245    Relationship:  Patient, Family TH - 05/02/2023 0245    Does the primary learner have any barriers to learning?:  No Barriers TH - 05/02/2023 0245    What is the preferred language of the primary  learner?:  English TH - 05/02/2023 0245    Is an  required?:  No TH - 05/02/2023 0245    How does the primary learner prefer to learn new concepts?:  Listening, Reading, Demonstration TH - 05/02/2023 0245    How often do you need to have someone help you read instructions, pamphlets, or written material from your doctor or pharmacy?:  Rarely TH - 05/02/2023 0245        CO-LEARNER #1     No question answered        CO-LEARNER #2     No question answered        SPECIAL TOPICS     No question answered        ANSWERED BY:     -:  Patient TH - 05/02/2023 0245        Edit History       Dania Briscoe RN - RN (Nurse)   05/02/2023 0245

## 2023-05-11 ENCOUNTER — PATIENT OUTREACH (OUTPATIENT)
Dept: ADMINISTRATIVE | Facility: OTHER | Age: 88
End: 2023-05-11
Payer: MEDICARE

## 2023-05-11 NOTE — PROGRESS NOTES
This Community Health Worker (CHW) competed Social Determinant of Health (SDOH)  Questionnaire with patient/caregiver via telephone today.  Notified OPCM CM Azucena IBARRA RN, of completion.      Patient denied any SDOH needs at this time.

## 2023-05-15 PROBLEM — Z98.890 S/P CATHETER ABLATION OF SLOW PATHWAY: Status: ACTIVE | Noted: 2023-05-15

## 2023-05-15 PROBLEM — R09.89 RIGHT CAROTID BRUIT: Status: RESOLVED | Noted: 2021-03-30 | Resolved: 2023-05-15

## 2023-05-15 PROBLEM — Z86.79 S/P CATHETER ABLATION OF SLOW PATHWAY: Status: ACTIVE | Noted: 2023-05-15

## 2023-05-15 PROBLEM — I47.19 AVNRT (AV NODAL RE-ENTRY TACHYCARDIA): Status: ACTIVE | Noted: 2023-05-02

## 2023-05-15 PROBLEM — E83.42 HYPOMAGNESEMIA: Status: RESOLVED | Noted: 2022-12-14 | Resolved: 2023-05-15

## 2023-05-15 PROBLEM — R00.0 TACHYCARDIA: Status: RESOLVED | Noted: 2022-12-14 | Resolved: 2023-05-15

## 2023-05-25 ENCOUNTER — OUTPATIENT CASE MANAGEMENT (OUTPATIENT)
Dept: ADMINISTRATIVE | Facility: OTHER | Age: 88
End: 2023-05-25
Payer: MEDICARE

## 2023-05-25 NOTE — PROGRESS NOTES
Outpatient Care Management  Plan of Care Follow Up Visit    Patient: Lilia Chino  MRN: [id number]  Date of Service: 05/25/2023  Completed by: Rima Sainz RN  Referral Date: 05/02/2023    Reason for Visit   Patient presents with    OPCM RN Follow Up Call       Brief Summary: OPCM RN followed up with Ms. Chino today and she is doing pretty good.  Next Steps: Left message for Ranjan, son.

## 2023-06-02 ENCOUNTER — OUTPATIENT CASE MANAGEMENT (OUTPATIENT)
Dept: ADMINISTRATIVE | Facility: OTHER | Age: 88
End: 2023-06-02
Payer: MEDICARE

## 2023-06-02 ENCOUNTER — PATIENT MESSAGE (OUTPATIENT)
Dept: ADMINISTRATIVE | Facility: OTHER | Age: 88
End: 2023-06-02
Payer: MEDICARE

## 2023-06-02 NOTE — PROGRESS NOTES
6/2/2023 2nd attempt to complete Follow-Up  for Outpatient Care Management, left message.  Will send via portal - unable to assess letter.

## 2023-06-11 NOTE — TELEPHONE ENCOUNTER
----- Message from Cassandra Farrell sent at 10/31/2018  2:49 PM CDT -----  Contact: [phone]  Patient is requesting a call back from the nurse concerning appt.  Please call the patient upon request at phone number [phone].     Face Mask

## 2023-06-14 ENCOUNTER — OUTPATIENT CASE MANAGEMENT (OUTPATIENT)
Dept: ADMINISTRATIVE | Facility: OTHER | Age: 88
End: 2023-06-14
Payer: MEDICARE

## 2023-06-14 NOTE — PROGRESS NOTES
6/14/2023  3rd attempt to complete Follow-Up  for Outpatient Care Management, left message (included to contact OPCM RN for any needs/questions/assistance or guidance in regard to your healthcare).  Will mail unable to assess letter and [facility] - connecting with your [facility] team pamphlet.  Case closure - unable to reach patient's son at her request in regard to her healthcare progress.

## 2023-06-14 NOTE — LETTER
Lilia Chino  [address] [facility] [address]      Dear Lilia Chino,     I am your nurse with [facility]s Outpatient Care Management Department. I have been unsuccessful at reaching Ranjan Derase, as per your request in regard to your health care progress.      Please have Ranjan contact me at [phone] from 8:00AM to 4:30 PM on Monday thru Friday.     As you know, [facility] is a program offered to you through [facility] where a nurse is available 24/7 to answer questions or provide medical advice, their number is [phone].    Thanks,      Rima Sainz, RN  Outpatient Care Management

## 2023-10-24 PROBLEM — G31.84 MILD COGNITIVE IMPAIRMENT: Status: ACTIVE | Noted: 2023-10-24

## 2023-10-24 PROBLEM — M46.1 SACROILIITIS: Status: RESOLVED | Noted: 2018-06-06 | Resolved: 2023-10-24

## 2023-10-24 PROBLEM — D68.69 HYPERCOAGULABLE STATE ASSOCIATED WITH COVID-19: Status: RESOLVED | Noted: 2022-12-14 | Resolved: 2023-10-24

## 2023-10-24 PROBLEM — U07.1 HYPERCOAGULABLE STATE ASSOCIATED WITH COVID-19: Status: RESOLVED | Noted: 2022-12-14 | Resolved: 2023-10-24

## 2023-10-24 PROBLEM — E11.51 TYPE 2 DIABETES MELLITUS WITH DIABETIC PERIPHERAL ANGIOPATHY WITHOUT GANGRENE, WITHOUT LONG-TERM CURRENT USE OF INSULIN: Status: ACTIVE | Noted: 2023-10-24

## 2023-11-01 PROBLEM — D63.8 ANEMIA, CHRONIC DISEASE: Status: ACTIVE | Noted: 2019-02-21

## 2023-11-01 PROBLEM — E87.70 VOLUME OVERLOAD: Status: ACTIVE | Noted: 2023-11-01

## 2023-11-07 ENCOUNTER — OUTPATIENT CASE MANAGEMENT (OUTPATIENT)
Dept: ADMINISTRATIVE | Facility: OTHER | Age: 88
End: 2023-11-07
Payer: MEDICARE

## 2023-11-07 PROBLEM — I50.22 CHRONIC SYSTOLIC CONGESTIVE HEART FAILURE: Status: ACTIVE | Noted: 2023-11-07

## 2023-11-07 NOTE — PROGRESS NOTES
Outpatient Care Management  Initial Patient Assessment    Patient: Lilia Chino  MRN: [id number]  Date of Service: 11/07/2023  Completed by: Rima Sainz RN  Referral Date: 10/24/2023  Date of Eligibility: 10/25/2023  Program: High Risk  Status: Ongoing  Effective Dates: 11/8/2023 - present  Responsible Staff: Rima Sainz RN        Reason for Visit   Patient presents with    OPCM Enrollment Call       Brief Summary:  Lilia Chino was referred by Dr. Delgado for DM2 with DM mononeuropathy, mild cognitive. Patient qualifies for program based on high risk score 78.3%.   Active problem list, medical, surgical and social history reviewed. Active comorbidities include SSS, pacemaker, osteoporosis, lumbar radiculopathy, neuropathy, smoldering myeloma, glaucoma, CAD, PAF, DM2, HTN, chronic anemia, GERD, falls. Areas of need identified by patient include pain to neck, arms, and knees.   Next steps:   Ms. Chino agreed to OPCM RN follow up on or around 11/14/23.  Follow up with other places in her area to obtain citracal with D - after review, [facility] carries it - least cost noted.  Explore quality of life with pain.  Consider pain mgmt or palliative care.  Promote fall risk prevention by making home and environmental adjustments.  Follow up re: BSC.  Mail edu - falls, DM neuropathy, foot care for DM.  PATIENT SELF MANAGEMENT PLAN:  Ms. Chino agreed to use ice pack to knees for c/o pain if needed.  Ms. Chino agreed for OPCM SW to contact her re: possible assistance with cost of BSC.  Disability Status  Is the patient alert and oriented (person, place, time, and situation)?: Alert and oriented x 4  Hearing Difficulty or Deaf: no  Visual Difficulty or Blind: yes  Difficulty Concentrating, Remembering or Making Decisions: yes  Communication Difficulty: no  Eating/Swallowing Difficulty: no  Walking or Climbing Stairs Difficulty: no  Dressing/Bathing Difficulty: no  Toileting : Independent  Continence : Continence -  Not a problem  Difficulty Managing Errands Independently: no  Equipment Currently Used at Home: cane, quad; glucometer  Change in Functional Status Since Onset of Current Illness/Injury: yes        Spiritual Beliefs  Spiritual, Cultural Beliefs, Scientology Practices, Values that Affect Care: no      Social History     Socioeconomic History    Marital status:    Tobacco Use    Smoking status: Never    Smokeless tobacco: Never   Substance and Sexual Activity    Alcohol use: No     Alcohol/week: 0.0 standard drinks of alcohol    Drug use: No    Sexual activity: Never   Social History Narrative    ** Merged History Encounter **          Social Determinants of Health     Financial Resource Strain: Low Risk  (5/11/2023)    Overall Financial Resource Strain (CARDIA)     Difficulty of Paying Living Expenses: Not hard at all   Food Insecurity: No Food Insecurity (5/11/2023)    Hunger Vital Sign     Worried About Running Out of Food in the Last Year: Never true     Ran Out of Food in the Last Year: Never true   Transportation Needs: No Transportation Needs (11/3/2023)    OASIS : Transportation     Lack of Transportation (Medical): No     Lack of Transportation (Non-Medical): No     Patient Unable or Declines to Respond: No   Physical Activity: Sufficiently Active (5/11/2023)    Exercise Vital Sign     Days of Exercise per Week: 7 days     Minutes of Exercise per Session: 30 min   Stress: No Stress Concern Present (5/11/2023)    [employer] - Occupational Stress Questionnaire     Feeling of Stress : Only a little   Social Connections: Feeling Socially Integrated (11/3/2023)    OASIS : Social Isolation     Frequency of experiencing loneliness or isolation: Never   Housing Stability: Low Risk  (5/11/2023)    Housing Stability Vital Sign     Unable to Pay for Housing in the Last Year: No     Number of Places Lived in the Last Year: 1     Unstable Housing in the Last Year: No        Roles and Relationships       Advance Directives (For Healthcare)  Advance Directive  (If Adv Dir status is received, view document under Adv Dir in header or Chart Review Media tab): Patient does not have Advance Directive, declines information.        Patient Reported Insurance  Verified current insurance plan:: Medicare; Other (see comment)            11/8/2023    12:14 PM 5/9/2023     3:38 PM 4/17/2023     8:31 AM 4/19/2022     9:25 AM 2/21/2019    10:02 AM 1/10/2018     2:15 PM 7/20/2017    10:00 AM   Depression Patient Health Questionnaire   Over the last two weeks how often have you been bothered by little interest or pleasure in doing things Not at all Not at all Not at all Several days Not at all Not at all Not at all   Over the last two weeks how often have you been bothered by feeling down, depressed or hopeless Not at all Not at all Not at all Not at all Not at all Not at all Several days   PHQ-2 Total Score 0 0 0 1 0 0 1       Learning Assessment       11/01/2023 1057 [facility] (11/1/2023 - 11/2/2023)   Created by Smitha Nagel, RN - RN (Nurse) Status: Complete                 PRIMARY LEARNER     Primary Learner Name:  Lilia Chino AW - 11/01/2023 1057    Relationship:  Patient AW - 11/01/2023 1057    Does the primary learner have any barriers to learning?:  No Barriers AW - 11/01/2023 1057    What is the preferred language of the primary learner?:  English AW - 11/01/2023 1057    Is an  required?:  No AW - 11/01/2023 1057    How does the primary learner prefer to learn new concepts?:  Listening AW - 11/01/2023 1057    How often do you need to have someone help you read instructions, pamphlets, or written material from your doctor or pharmacy?:  Never AW - 11/01/2023 1057        CO-LEARNER #1     No question answered        CO-LEARNER #2     No question answered        SPECIAL TOPICS     No question answered        ANSWERED BY:     No question answered         Edit History       Smitha Nagel, RN - RN (Nurse)   11/01/2023 1057                            11/7/23 - OPCM RN followed up with Mrs. Chino re: referral from PCP; she is currently in an [place] office and they are calling her back.  OPCM RN will follow up later today and she was in agreement.

## 2023-11-07 NOTE — LETTER
November 8, 2023             Dear Lilia,    Welcome to [facility]s Complex Care Management Program.  It was a pleasure talking with you today.  My name is Rima Sainz, and I look forward to being your Care Manager.  My goal is to help you function at the healthiest and highest level possible.  You can contact me directly at [phone].    As an [facility] patient, some of the services we may be able to provide include:     Development of an individualized care plan with a Registered Nurse   Connection with a   Connection with available resources and services    Coordinate communication among your care team members   Provide coaching and education   Help you understand your doctors treatment plan  Help you obtain information about your insurance coverage.     All services provided by [facility]s Complex Care Managers and other care team members are coordinated with and communicated to your primary care team.      As part of your enrollment, you will be receiving education materials and more information about these services in your [facility] account, by phone or through the mail.  If you do not wish to participate or receive information, please contact our office at [phone].      Sincerely,        Rima Sainz, RN  [facility]   Out-patient RN Complex Care Manager

## 2023-11-08 ENCOUNTER — OUTPATIENT CASE MANAGEMENT (OUTPATIENT)
Dept: ADMINISTRATIVE | Facility: OTHER | Age: 88
End: 2023-11-08
Payer: MEDICARE

## 2023-11-08 ENCOUNTER — TELEPHONE (OUTPATIENT)
Dept: PHARMACY | Facility: CLINIC | Age: 88
End: 2023-11-08
Payer: MEDICARE

## 2023-11-08 NOTE — PROGRESS NOTES
Outpatient Care Management   - High Risk Patient Assessment    Patient: Lilia Chino  MRN:  [id number]  Date of Service:  11/8/2023  Completed by:  Acacia Samuels LCSW  Referral Date: 10/24/2023    Reason for Visit   Patient presents with    Social Work Assessment - High Risk       Brief Summary:  received a referral from OPCM FRANKIE IBARRA for the following patient identified psycho-social needs of pt needing a bedside commode. Care plan was created in collaboration with patient/caregiver input.  completed the SDOH questionnaire.

## 2023-11-08 NOTE — LETTER
December 4, 2023    Lilia Chino  [address]  [place] [address]             [facility] - Pharmacy Assistance  [address]  [address]  [place] [address]  Phone: [phone]  Fax: [phone] Dear Ms. Lilia Chino     It was a pleasure speaking with you. To follow up on our conversation on 12/4/2023, the Pharmacy Patient Assistance Program needs more information from you before we can submit your Farxiga application to the Az&Me Program. Please return the following documents to the Pharmacy Patient Assistance office (address, email and fax listed below) asap:      Proof of household Income( such as social security statement, 1099 form, pension statement or 3 consecutive pay stubs, Copy of all Insurance cards( front and back), Printout from your Insurance or Pharmacy that shows how much you have spent on prescriptions this year, and Signed and dated HIPAA /Patient Information Forms              Whats Next:     Once I receive your documentation and authorization from your Provider, your application will be submitted to the Respected Assistance Program for review. Please be advised it will take 2 to 4 weeks for your application to be processed so you may have to purchase a month's supply of medication from your pharmacy to hold you over during the waiting period. You will be notified of approval or denial by The Program(mail) or myself.      If you have any questions or concerns, please give me a call         Sincerely   Chris Davenport

## 2023-11-08 NOTE — LETTER
November 9, 2023    Lilia Chino  [address]  [place] [address]             [facility] - Pharmacy Assistance  [address]  [address]  [place] [address]  Phone: [phone]  Fax: [phone] Dear Ms. Lilia Chino     It was a pleasure speaking with you. To follow up on our conversation on 11/9/2023, the Pharmacy Patient Assistance Program needs more information from you before we can submit your Farxiga application to the Az&Me Program. Please return the following documents to the Pharmacy Patient Assistance office (address, email and fax listed below) asap:      Proof of household Income( such as social security statement, 1099 form, pension statement or 3 consecutive pay stubs, Copy of all Insurance cards( front and back), Printout from your Insurance or Pharmacy that shows how much you have spent on prescriptions this year, and Signed and dated HIPAA /Patient Information Forms              Whats Next:     Once I receive your documentation and authorization from your Provider, your application will be submitted to the Respected Assistance Program for review. Please be advised it will take 2 to 4 weeks for your application to be processed so you may have to purchase a month's supply of medication from your pharmacy to hold you over during the waiting period. You will be notified of approval or denial by The Program(mail) or myself.      If you have any questions or concerns, please give me a call         Sincerely   Chris Davenport

## 2023-11-09 NOTE — TELEPHONE ENCOUNTER
I have reached out to Lilia Chino to inform her of the Az&Me application process for Farxiga and whats required to apply.  Lilia Chino did not answer. I left a voicemail and mailed a letter introducing her to the pharmacy patient assistance program. I will follow up in 5 business days.

## 2023-11-18 NOTE — TELEPHONE ENCOUNTER
A 2nd attempt has been made to establish contact with Lilia Chino  via MY CHART. The final contact attempt will be made in 5 business days

## 2023-11-20 ENCOUNTER — OUTPATIENT CASE MANAGEMENT (OUTPATIENT)
Dept: ADMINISTRATIVE | Facility: OTHER | Age: 88
End: 2023-11-20
Payer: MEDICARE

## 2023-12-04 NOTE — TELEPHONE ENCOUNTER
I have confirmed with Ms. Chino by PHONE that she does not need prescription assistance at this time. Ms. Chino stated they have enough medication until the end of the year.

## 2023-12-12 ENCOUNTER — OUTPATIENT CASE MANAGEMENT (OUTPATIENT)
Dept: ADMINISTRATIVE | Facility: OTHER | Age: 88
End: 2023-12-12
Payer: MEDICARE

## 2023-12-12 NOTE — PROGRESS NOTES
Outpatient Care Management  Plan of Care Follow Up Visit    Patient: Lilia Chino  MRN: [id number]  Date of Service: 12/12/2023  Completed by: Rima Sainz RN  Referral Date: 10/24/2023    Reason for Visit   Patient presents with    OPCM RN Follow Up Call       Brief Summary: OPCM RN followed up with Mrs. Chino today for care plan review. SDOH completed.    Next Steps:   Mrs. Chino agreed to OPCM RN follow up on or around 12/26/23.  Follow up on decision of BSC (out of pocket cost).  Assess sleep.  PATIENT SELF MANAGEMENT PLAN:  Mrs. Chino agreed to be mindful when ambulating to and from each room in her home; continue to wear nonskid/slip soled shoes.

## 2023-12-29 ENCOUNTER — OUTPATIENT CASE MANAGEMENT (OUTPATIENT)
Dept: ADMINISTRATIVE | Facility: OTHER | Age: 88
End: 2023-12-29
Payer: MEDICARE

## 2023-12-29 NOTE — PROGRESS NOTES
Outpatient Care Management  Plan of Care Follow Up Visit    Patient: Lilia Chino  MRN: [id number]  Date of Service: 12/29/2023  Completed by: Rima Sainz RN  Referral Date: 10/24/2023    Reason for Visit   Patient presents with    OPCM RN Follow Up Call       Brief Summary: OPCM RN followed up with Mrs. Chino today for care plan review.    Next Steps:   Follow up/consider PT.  Ms. Chino agreed to OPCM RN follow up on or around 01/12/2024.  PATIENT SELF MANAGEMENT PLAN:  Ms. Chino agreed to continue using the walker to ambulate safely by next follow up call.

## 2024-01-16 ENCOUNTER — OUTPATIENT CASE MANAGEMENT (OUTPATIENT)
Dept: ADMINISTRATIVE | Facility: OTHER | Age: 89
End: 2024-01-16
Payer: MEDICARE

## 2024-01-16 NOTE — PROGRESS NOTES
1/16/2024  Follow up call, no answer on pt's home phone; called secondary number and spoke with pt's son, Ranjan, who said he would let Mrs. Chino know we are attempting to reach her.

## 2024-01-25 ENCOUNTER — OUTPATIENT CASE MANAGEMENT (OUTPATIENT)
Dept: ADMINISTRATIVE | Facility: OTHER | Age: 89
End: 2024-01-25
Payer: MEDICARE

## 2024-01-25 NOTE — PROGRESS NOTES
Outpatient Care Management  Plan of Care Follow Up Visit    Patient: Lilia Chino  MRN: [id number]  Date of Service: 01/25/2024  Completed by: Rima Sainz RN  Referral Date: 10/24/2023    Reason for Visit   Patient presents with    OPCM RN Follow Up Call       Brief Summary: OPCM RN followed up with Mrs. Rodrigues today for care plan review.    Next Steps:   Mrs. Rdorigues agreed to OPCM RN follow up on or around 2/8/24.  Review physical activity.  Message PCP of medication reconciliation and recent hospitalization to [place] (The following was found - started Latanoprost .005% 1 drop each eye at bedtime; Farxiga was discontinued; Lasix changed to 10 mg daily as needed for swelling; gabapentin changed to 100 mg 3 x per day; lisinopril was discontinued).  PATIENT SELF MANAGEMENT PLAN:  Mrs. Chino agreed to be mindful when ambulating to and from each room in her home; continue to wear nonskid/slip soled shoes.

## 2024-02-08 PROBLEM — R07.89 OTHER CHEST PAIN: Status: ACTIVE | Noted: 2024-02-08

## 2024-02-09 ENCOUNTER — OUTPATIENT CASE MANAGEMENT (OUTPATIENT)
Dept: ADMINISTRATIVE | Facility: OTHER | Age: 89
End: 2024-02-09
Payer: MEDICARE

## 2024-02-09 NOTE — PROGRESS NOTES
2/8/24 - Upon review for follow up OPCM call, Mrs. Chino noted in hospital for c/o chest pain; will follow post holiday for definitive discharge plans.

## 2024-02-19 ENCOUNTER — OUTPATIENT CASE MANAGEMENT (OUTPATIENT)
Dept: ADMINISTRATIVE | Facility: OTHER | Age: 89
End: 2024-02-19
Payer: MEDICARE

## 2024-04-30 PROBLEM — C90.00 MULTIPLE MYELOMA NOT HAVING ACHIEVED REMISSION: Status: ACTIVE | Noted: 2024-04-30

## 2024-04-30 PROBLEM — E78.5 DYSLIPIDEMIA ASSOCIATED WITH TYPE 2 DIABETES MELLITUS: Status: ACTIVE | Noted: 2024-04-30

## 2024-04-30 PROBLEM — E11.69 DYSLIPIDEMIA ASSOCIATED WITH TYPE 2 DIABETES MELLITUS: Status: ACTIVE | Noted: 2024-04-30

## 2024-04-30 PROBLEM — N18.32 STAGE 3B CHRONIC KIDNEY DISEASE: Status: ACTIVE | Noted: 2024-04-30

## 2024-07-14 PROBLEM — R42 DIZZINESS OF UNKNOWN ETIOLOGY: Status: ACTIVE | Noted: 2024-07-14

## 2024-07-14 PROBLEM — E86.1 INTRAVASCULAR VOLUME DEPLETION: Status: ACTIVE | Noted: 2024-07-14

## 2024-07-15 PROBLEM — R55 POSTURAL DIZZINESS WITH NEAR SYNCOPE: Status: ACTIVE | Noted: 2024-07-15

## 2024-07-15 PROBLEM — R54 AGE-RELATED PHYSICAL DEBILITY: Status: ACTIVE | Noted: 2024-07-15

## 2024-07-15 PROBLEM — N30.00 ACUTE CYSTITIS WITHOUT HEMATURIA: Status: ACTIVE | Noted: 2024-07-15

## 2024-07-15 PROBLEM — R42 POSTURAL DIZZINESS WITH NEAR SYNCOPE: Status: ACTIVE | Noted: 2024-07-15

## 2024-08-22 ENCOUNTER — OUTPATIENT CASE MANAGEMENT (OUTPATIENT)
Dept: ADMINISTRATIVE | Facility: OTHER | Age: 89
End: 2024-08-22
Payer: MEDICARE

## 2024-08-22 NOTE — LETTER
August 22, 2024             Dear Lilia,    Welcome to [facility]s Complex Care Management Program.  It was a pleasure talking with you today.  My name is Rima Sainz, and I look forward to being your Care Manager.  My goal is to help you function at the healthiest and highest level possible.  You can contact me directly at [phone].    As an [facility] patient, some of the services we may be able to provide include:     Development of an individualized care plan with a Registered Nurse   Connection with a   Connection with available resources and services    Coordinate communication among your care team members   Provide coaching and education   Help you understand your doctors treatment plan  Help you obtain information about your insurance coverage.     All services provided by [facility]s Complex Care Managers and other care team members are coordinated with and communicated to your primary care team.      As part of your enrollment, you will be receiving education materials and more information about these services in your [facility] account, by phone or through the mail.  If you do not wish to participate or receive information, please contact our office at [phone].      Sincerely,        Rima Sainz, RN  [facility]   Out-patient RN Complex Care Manager

## 2024-08-22 NOTE — PROGRESS NOTES
Outpatient Care Management  Initial Patient Assessment    Patient: Lilia Chino  MRN: [id number]  Date of Service: 08/22/2024  Completed by: Rima Sainz RN  Referral Date: 07/15/2024  Date of Eligibility: 7/16/2024  Program:   High Risk  Status: Ongoing  Effective Dates: 8/22/2024 - present  Responsible Staff: Rima Sainz RN        Reason for Visit   Patient presents with    OPCM Enrollment Call    Nursing Assessment       Brief Summary:  Lilia Chino was referred by Dr. Scherer for dizziness. Patient qualifies for program based on high risk score 91.3%.   Active problem list, medical, surgical and social history reviewed. Active comorbidities include DM2, HTN, dyslipidemia, CKD, SSS s/p pacemaker placement, myeloma assc with amyloidosis, afib, CAD, chronic anemia, GERD, falls, osteoporosis, and neuropathies. Areas of need identified by patient include dizziness.   Next steps:   Mrs. Chino agreed to OPCM RN follow up on or around 8/28/24.  Follow up re: edu dizziness questions/comments.  Review edu on preventing falls in older adults; answer questions.  Instruct on falls precautions.  PATIENT SELF MANAGEMENT PLAN:  Mrs. Chino agreed to review the edu on dizziness prior to next follow up call.  Mrs. Chino agreed to review edu onpreventing falls in older adults prior to next call.    Disability Status  Is the patient alert and oriented (person, place, time, and situation)?: Alert and oriented x 4  Hearing Difficulty or Deaf: no  Visual Difficulty or Blind: yes  Visual and Hearing Needs Conclusion: No difficulty hearing; wears Rx glasses; history of glaucoma; followed by [facility], last office visit 5/2024.  Vision Management: -- (glasses)  Difficulty Concentrating, Remembering or Making Decisions: yes  Communication Difficulty: no  Eating/Swallowing Difficulty: no  Walking or Climbing Stairs Difficulty: no  Dressing/Bathing Difficulty: no  Toileting : Independent  Continence :  Incontience - Bladder (Does wear incontinent briefs when out due to unable to get to the bathroom quickly enough.)  Difficulty Managing Errands Independently: yes  Equipment Currently Used at Home: blood pressure machine; grab bar; cane, quad; walker, rolling; glucometer; other (see comments); scale (RPM program (pulse oximeter))  ADL Conclusion Statement: Ambulates on own; denied driving; son and grandson assists; cooks.  Change in Functional Status Since Onset of Current Illness/Injury: no        Spiritual Beliefs  Spiritual, Cultural Beliefs, Jehovah's witness Practices, Values that Affect Care: no      Social History     Socioeconomic History    Marital status:    Tobacco Use    Smoking status: Never    Smokeless tobacco: Never   Substance and Sexual Activity    Alcohol use: No     Alcohol/week: 0.0 standard drinks of alcohol    Drug use: No    Sexual activity: Never   Social History Narrative    ** Merged History Encounter **          Social Determinants of Health     Financial Resource Strain: Low Risk  (7/14/2024)    Overall Financial Resource Strain (CARDIA)     Difficulty of Paying Living Expenses: Not very hard   Food Insecurity: No Food Insecurity (7/14/2024)    Hunger Vital Sign     Worried About Running Out of Food in the Last Year: Never true     Ran Out of Food in the Last Year: Never true   Transportation Needs: No Transportation Needs (7/14/2024)    TRANSPORTATION NEEDS     Transportation : No   Physical Activity: Inactive (7/14/2024)    Exercise Vital Sign     Days of Exercise per Week: 0 days     Minutes of Exercise per Session: 0 min   Stress: No Stress Concern Present (7/14/2024)    [employer] - Occupational Stress Questionnaire     Feeling of Stress : Only a little   Housing Stability: Low Risk  (7/14/2024)    Housing Stability Vital Sign     Unable to Pay for Housing in the Last Year: No     Homeless in the Last Year: No       Roles and Relationships  Primary  Source of Support/Comfort: child(pat)  Name of Support/Comfort Primary Source: Ranjan Chino, Son  Secondary Source of Support/Comfort: child(pat)  Name of Support/Comfort Secondary Source: Patrick MattiMichael vega      Advance Directives (For Healthcare)  Advance Directive  (If Adv Dir status is received, view document under Adv Dir in header or Chart Review Media tab): Patient does not have Advance Directive, declines information.        Patient Reported Insurance  Verified current insurance plan:: Medicare; Other (see comment) ([facility])            8/22/2024    11:31 AM 4/30/2024     8:29 AM 11/8/2023    12:14 PM 5/9/2023     3:38 PM 4/17/2023     8:31 AM 4/19/2022     9:25 AM 2/21/2019    10:02 AM   Depression Patient Health Questionnaire   Over the last two weeks how often have you been bothered by little interest or pleasure in doing things Not at all Not at all Not at all Not at all Not at all Several days Not at all   Over the last two weeks how often have you been bothered by feeling down, depressed or hopeless Not at all Not at all Not at all Not at all Not at all Not at all Not at all   PHQ-2 Total Score 0 0 0 0 0 1 0       Learning Assessment       08/22/2024 1205 [facility] (8/22/2024 - Present)   Created by Rima Sainz, RN -  (Nurse) Status: Complete                 PRIMARY LEARNER     Primary Learner Name:  Ms. Chino DB - 08/22/2024 1205    Relationship:  Patient DB - 08/22/2024 1205    Does the primary learner have any barriers to learning?:  Visual DB - 08/22/2024 1205    What is the preferred language of the primary learner?:  English DB - 08/22/2024 1205    Is an  required?:  No DB - 08/22/2024 1205    How does the primary learner prefer to learn new concepts?:  Listening DB - 08/22/2024 1205    How often do you need to have someone help you read instructions, pamphlets, or written material from your doctor or pharmacy?:  Often DB - 08/22/2024 1205         CO-LEARNER #1     Co-Learner Name (if applicable):  Patrick Chino DB - 08/22/2024 1205    Relationship:  Other DB - 08/22/2024 1205    Does the co-learner have any barriers to learning?:  No Barriers DB - 08/22/2024 1205    What is the preferred language of the co-learner?:  English DB - 08/22/2024 1205    Is an  required?:  No DB - 08/22/2024 1205    How does the co-learner prefer to learn new concepts?:  Listening DB - 08/22/2024 1205        CO-LEARNER #2     No question answered        SPECIAL TOPICS     No question answered        ANSWERED BY:     No question answered        Comments         Edit History       Rima Sainz, RN -  (Nurse)   08/22/2024 1205

## 2024-08-23 ENCOUNTER — PATIENT MESSAGE (OUTPATIENT)
Dept: ADMINISTRATIVE | Facility: OTHER | Age: 89
End: 2024-08-23
Payer: MEDICARE

## 2024-09-03 ENCOUNTER — OUTPATIENT CASE MANAGEMENT (OUTPATIENT)
Dept: ADMINISTRATIVE | Facility: OTHER | Age: 89
End: 2024-09-03
Payer: MEDICARE

## 2024-09-03 NOTE — PROGRESS NOTES
Outpatient Care Management  Plan of Care Follow Up Visit    Patient: Lilia Chino  MRN: [id number]  Date of Service: 09/03/2024  Completed by: Rima Sainz RN  Referral Date: 07/15/2024    Reason for Visit   Patient presents with    OPCM RN Follow Up Call       Brief Summary: OPCM RN followed up with Mrs. Rodrigues today for care plan review.    Next Steps:   Promote use of personal vision and auditory aids.  Follow up re: use of walking stick.  PATIENT SELF MANAGEMENT PLAN:  Mrs. Rodrigues agreed to continue wearing nonslip shoes for falls prevention daily.

## 2024-09-11 ENCOUNTER — OUTPATIENT CASE MANAGEMENT (OUTPATIENT)
Dept: ADMINISTRATIVE | Facility: OTHER | Age: 89
End: 2024-09-11
Payer: MEDICARE

## 2024-09-11 NOTE — PROGRESS NOTES
9/11/2024  1st attempt to complete Follow-Up  for Outpatient Care Management, left message with Breanna       Negative

## 2024-09-23 ENCOUNTER — OUTPATIENT CASE MANAGEMENT (OUTPATIENT)
Dept: ADMINISTRATIVE | Facility: OTHER | Age: 89
End: 2024-09-23
Payer: MEDICARE

## 2024-09-23 NOTE — LETTER
Lilia Chino  [id number] [facility] [id number]      Dear Lilia Chino,     I am your nurse with [facility]s Outpatient [facility] Management Department. I was unsuccessful in reaching you today. At your earliest convenience, I would like to discuss your healthcare progress.      Please contact me at [phone] from 8:00AM to 4:30 PM on Monday thru Friday.     As you know, [facility] is a program offered to you through [facility] where a nurse is available 24/7 to answer questions or provide medical advice, their number is [phone].    Thanks,      Rima Sainz, RN  Outpatient Care Management

## 2024-09-26 NOTE — PROGRESS NOTES
9/23/2024  2nd attempt to complete Follow-Up  for Outpatient Care Management, left message.  Will send via portal -  unable to assess letter.

## 2024-10-11 ENCOUNTER — OUTPATIENT CASE MANAGEMENT (OUTPATIENT)
Dept: ADMINISTRATIVE | Facility: OTHER | Age: 89
End: 2024-10-11
Payer: MEDICARE

## 2024-10-11 NOTE — PROGRESS NOTES
Outpatient Care Management  Plan of Care Follow Up Visit    Patient: Lilia Chino  MRN: [id number]  Date of Service: 10/11/2024  Completed by: Rima Sainz RN  Referral Date: 07/15/2024    Reason for Visit   Patient presents with    OPCM RN Follow Up Call       Brief Summary: OPCM RN followed up with Mrs. Rodrigues today for care plan review.    Next Steps:   Mrs. Rodrigues agreed to OPCM RN follow up on or around 11/1/24.  Address risk factors associated with dizziness.  PATIENT SELF MANAGEMENT PLAN:  Mrs. Rodrigues agreed to avoid sudden head movements, when moving, turn slowly with ambulation daily.

## 2024-10-31 ENCOUNTER — PATIENT MESSAGE (OUTPATIENT)
Dept: OTOLARYNGOLOGY | Facility: CLINIC | Age: 89
End: 2024-10-31
Payer: MEDICARE

## 2024-10-31 PROBLEM — N18.30 BENIGN HYPERTENSION WITH CKD (CHRONIC KIDNEY DISEASE) STAGE III: Status: ACTIVE | Noted: 2024-10-31

## 2024-10-31 PROBLEM — Z79.01 CHRONIC ANTICOAGULATION: Status: ACTIVE | Noted: 2024-10-31

## 2024-10-31 PROBLEM — I12.9 BENIGN HYPERTENSION WITH CKD (CHRONIC KIDNEY DISEASE) STAGE III: Status: ACTIVE | Noted: 2024-10-31

## 2024-11-04 PROBLEM — I50.22 HEART FAILURE WITH MILDLY REDUCED EJECTION FRACTION: Status: ACTIVE | Noted: 2024-11-04

## 2024-11-04 PROBLEM — I50.9 CHF (NYHA CLASS II, ACC/AHA STAGE C): Status: ACTIVE | Noted: 2024-11-04

## 2024-11-07 ENCOUNTER — OUTPATIENT CASE MANAGEMENT (OUTPATIENT)
Dept: ADMINISTRATIVE | Facility: OTHER | Age: 89
End: 2024-11-07
Payer: MEDICARE

## 2024-11-07 NOTE — PROGRESS NOTES
Outpatient Care Management  Plan of Care Follow Up Visit    Patient: Lilia Chino  MRN: [id number]  Date of Service: 11/07/2024  Completed by: Rima aSinz RN  Referral Date: 07/15/2024    Reason for Visit   Patient presents with    OPCM RN Follow Up Call       Brief Summary: OPCM RN followed up with Mrs. Rodrigues today for care plan review.    Next Steps:   Mrs. Rodrigues agreed to OPCM RN follow up on or around 11/25/24.  Consider medical alert system.  Consider case closure if stable.  PATIENT SELF MANAGEMENT PLAN:  Mrs. Rodrigues agreed to stand slowly and keep assistive device near her at all times.

## 2024-11-27 ENCOUNTER — OUTPATIENT CASE MANAGEMENT (OUTPATIENT)
Dept: ADMINISTRATIVE | Facility: OTHER | Age: 89
End: 2024-11-27
Payer: MEDICARE

## 2024-11-27 NOTE — PROGRESS NOTES
Outpatient Care Management  Plan of Care Follow Up Visit    Patient: Lilia Chino  MRN: [id number]  Date of Service: 11/27/2024  Completed by: Rima Sainz RN  Referral Date: 07/15/2024    Reason for Visit   Patient presents with    OPCM RN Follow Up Call    Case Closure     Graduation       Grad certificate mailed.